# Patient Record
Sex: MALE | Race: BLACK OR AFRICAN AMERICAN | Employment: OTHER | URBAN - METROPOLITAN AREA
[De-identification: names, ages, dates, MRNs, and addresses within clinical notes are randomized per-mention and may not be internally consistent; named-entity substitution may affect disease eponyms.]

---

## 2017-02-10 ENCOUNTER — TRANSFERRED RECORDS (OUTPATIENT)
Dept: HEALTH INFORMATION MANAGEMENT | Facility: CLINIC | Age: 52
End: 2017-02-10

## 2017-03-29 ENCOUNTER — TRANSFERRED RECORDS (OUTPATIENT)
Dept: HEALTH INFORMATION MANAGEMENT | Facility: CLINIC | Age: 52
End: 2017-03-29

## 2017-04-13 ENCOUNTER — TRANSFERRED RECORDS (OUTPATIENT)
Dept: HEALTH INFORMATION MANAGEMENT | Facility: CLINIC | Age: 52
End: 2017-04-13

## 2017-05-09 ENCOUNTER — TRANSFERRED RECORDS (OUTPATIENT)
Dept: HEALTH INFORMATION MANAGEMENT | Facility: CLINIC | Age: 52
End: 2017-05-09

## 2017-05-19 ENCOUNTER — TRANSFERRED RECORDS (OUTPATIENT)
Dept: HEALTH INFORMATION MANAGEMENT | Facility: CLINIC | Age: 52
End: 2017-05-19

## 2017-05-24 ENCOUNTER — TRANSFERRED RECORDS (OUTPATIENT)
Dept: HEALTH INFORMATION MANAGEMENT | Facility: CLINIC | Age: 52
End: 2017-05-24

## 2017-06-21 ENCOUNTER — TRANSFERRED RECORDS (OUTPATIENT)
Dept: HEALTH INFORMATION MANAGEMENT | Facility: CLINIC | Age: 52
End: 2017-06-21

## 2017-07-11 ENCOUNTER — PRE VISIT (OUTPATIENT)
Dept: ORTHOPEDICS | Facility: CLINIC | Age: 52
End: 2017-07-11

## 2017-07-11 NOTE — TELEPHONE ENCOUNTER
1.  Date/reason for appt: 7/27/17 L hip avascular necrosis   2.  Referring provider: MICHELLE DAVIS   3.  Call to patient (Yes / No - short description): no referral   4.  Previous care at / records requested from: [facility]  5.  Other: Records received from [facility].   Included  Office notes: 6/21/17, 5/12/17   [facility] CC: 5/9/17(ER)  Radiology reports: xray chest on 5/19/17   MRI lumbar on 5/9/17- [facility]    MRI left hip on 4/13/17   US left lower ext on 3/29/17    Missing/needed records: injection- [facility]

## 2017-07-13 NOTE — TELEPHONE ENCOUNTER
Faxed request to [facility] for images, [facility] for records/images and [facility] for images.

## 2017-07-14 NOTE — TELEPHONE ENCOUNTER
Records received from [facility]. Waiting for image.   Included  Office notes: 5/24/17, 5/12/17, 2/10/17  Radiology reports: MRI lumbar on 2/10/17

## 2017-07-20 NOTE — TELEPHONE ENCOUNTER
Radiology reports received from [facility]. Images are in PACS.  Left lower ext 6/26/17, 3/29/17  Injection 6/8/17, 2/21/17  MRI left hip 4/13/17

## 2017-07-27 DIAGNOSIS — G89.29 CHRONIC LEFT HIP PAIN: Primary | ICD-10-CM

## 2017-07-27 DIAGNOSIS — M25.552 CHRONIC LEFT HIP PAIN: Primary | ICD-10-CM

## 2017-08-21 NOTE — TELEPHONE ENCOUNTER
Faxed request to [facility] for missing images.    Detail Level: Zone Plan: Retained suture fragment from inferior tip of scar. F/U in 3 months if pt does not see improvement and we discuss other possible options.

## 2017-09-07 ENCOUNTER — OFFICE VISIT (OUTPATIENT)
Dept: ORTHOPEDICS | Facility: CLINIC | Age: 52
End: 2017-09-07

## 2017-09-07 VITALS — BODY MASS INDEX: 27.58 KG/M2 | HEIGHT: 67 IN | WEIGHT: 175.7 LBS

## 2017-09-07 DIAGNOSIS — M25.552 HIP PAIN, LEFT: Primary | ICD-10-CM

## 2017-09-07 RX ORDER — ALBUTEROL SULFATE 90 UG/1
2 AEROSOL, METERED RESPIRATORY (INHALATION)
COMMUNITY
Start: 2017-05-24

## 2017-09-07 RX ORDER — FLUOXETINE 40 MG/1
40 CAPSULE ORAL
COMMUNITY

## 2017-09-07 RX ORDER — PHENYTOIN SODIUM 100 MG/1
100 CAPSULE, EXTENDED RELEASE ORAL
COMMUNITY
Start: 2017-03-10

## 2017-09-07 RX ORDER — ATORVASTATIN CALCIUM 40 MG/1
40 TABLET, FILM COATED ORAL
COMMUNITY
Start: 2017-08-21 | End: 2018-08-21

## 2017-09-07 RX ORDER — LORATADINE 10 MG/1
TABLET ORAL
COMMUNITY
Start: 2017-03-20

## 2017-09-07 RX ORDER — GLIPIZIDE 5 MG/1
5 TABLET, FILM COATED, EXTENDED RELEASE ORAL
COMMUNITY
Start: 2017-04-07 | End: 2018-04-07

## 2017-09-07 RX ORDER — TOLTERODINE 4 MG/1
4 CAPSULE, EXTENDED RELEASE ORAL
COMMUNITY

## 2017-09-07 RX ORDER — LISINOPRIL 2.5 MG/1
2.5 TABLET ORAL
COMMUNITY
Start: 2017-08-21 | End: 2018-08-21

## 2017-09-07 RX ORDER — CLOPIDOGREL BISULFATE 75 MG/1
75 TABLET ORAL
COMMUNITY
Start: 2017-08-21 | End: 2018-08-21

## 2017-09-07 RX ORDER — CYCLOBENZAPRINE HCL 10 MG
TABLET ORAL
COMMUNITY
Start: 2017-05-01

## 2017-09-07 RX ORDER — OXYCODONE AND ACETAMINOPHEN 5; 325 MG/1; MG/1
TABLET ORAL
COMMUNITY
Start: 2017-09-05

## 2017-09-07 RX ORDER — FLUTICASONE PROPIONATE 50 MCG
SPRAY, SUSPENSION (ML) NASAL
COMMUNITY
Start: 2017-07-03

## 2017-09-07 RX ORDER — METOPROLOL SUCCINATE 50 MG/1
50 TABLET, EXTENDED RELEASE ORAL
COMMUNITY
Start: 2017-09-07 | End: 2018-09-07

## 2017-09-07 ASSESSMENT — ACTIVITIES OF DAILY LIVING (ADL)
ADL_SUBSCALE_SCORE: 67.64
ADL_SUM: 22
ADL_MEAN: 1.29

## 2017-09-07 NOTE — PROGRESS NOTES
Chief Complaint: Consult (Pt. states that he is here today for Left Hip Pain. He mention that he been having pain for about 2 years. His last cortisone injection was 6 months ago, not effective. Referring:  NURY DAVIS)      Physician:  Nury Davis    HPI: Berto Pandey is a 51 year old male who presents today for evaluation of his left hip    Symptom Profile  Location of symptoms: 1) lateral hip 2) center of the low back, buttock, posterior thigh, calf.  Duration of symptoms: over two years    Previous Treatments: Previous treatments include activity modification, oral pain medication    Has had two lumbar spine injections this year and the first was reported as 50% initial improvement of symptoms and the second as 100% intial relief of symptoms.     Current Status:  Results of the patient s Hip Disability and Osteoarthritis Outcome Score (HOOS)  are as follows (0-100 scales with 100 being the theoretical best):  Pain: 57.5  Symptoms:75  ADLs:67.64  Sports/Recreation:50  Quality of Life:25  (http://koos.nu/)  [facility] Activity Score:    MEDICAL HISTORY: No past medical history on file.  Seizures- last seizure within the past year   Prostate CA- has been treated  Hrt trouble- has angina including at rest and reports what sounds like a history of angioplasty  DM  History of low back pain with radicular symptoms    Pertinent negatives:  Patient has no history of DVT or PE. Discussed risk factors.    Medications:     Current Outpatient Prescriptions:      albuterol (PROAIR HFA/PROVENTIL HFA/VENTOLIN HFA) 108 (90 BASE) MCG/ACT Inhaler, Inhale 2 puffs into the lungs, Disp: , Rfl:      aspirin 81 MG tablet, Take 81 mg by mouth, Disp: , Rfl:      atorvastatin (LIPITOR) 40 MG tablet, Take 40 mg by mouth, Disp: , Rfl:      clopidogrel (PLAVIX) 75 MG tablet, Take 75 mg by mouth, Disp: , Rfl:      cyclobenzaprine (FLEXERIL) 10 MG tablet, TAKE 1 TABLET BY MOUTH THREE TIMES A DAY AS NEEDED FOR MUSCLE SPASMS FOR UP TO 10  DAYS., Disp: , Rfl:      FLUoxetine (PROZAC) 40 MG capsule, Take 40 mg by mouth, Disp: , Rfl:      fluticasone (FLONASE) 50 MCG/ACT spray, PLACE 2 SPRAYS INTO BOTH NOSTRILS DAILY., Disp: , Rfl:      glipiZIDE (GLUCOTROL XL) 5 MG 24 hr tablet, Take 5 mg by mouth, Disp: , Rfl:      lisinopril (PRINIVIL/ZESTRIL) 2.5 MG tablet, Take 2.5 mg by mouth, Disp: , Rfl:      loratadine (CLARITIN) 10 MG tablet, TAKE 1 TAB BY MOUTH DAILY., Disp: , Rfl:      phenytoin (DILANTIN) 100 MG CR capsule, Take 100 mg by mouth, Disp: , Rfl:      tolterodine (DETROL LA) 4 MG 24 hr capsule, Take 4 mg by mouth, Disp: , Rfl:      oxyCODONE-acetaminophen (PERCOCET) 5-325 MG per tablet, , Disp: , Rfl:      Multiple Vitamins-Iron (MULTI-VITAMIN  /IRON) TABS, , Disp: , Rfl:      metoprolol (TOPROL-XL) 50 MG 24 hr tablet, Take 50 mg by mouth, Disp: , Rfl:      phenytoin (DILANTIN) 100 MG ER capsule, Take 300 mg by mouth daily, Disp: , Rfl:      PHENobarbital (LUMINAL) 30 MG tablet, Take 30 mg by mouth daily, Disp: , Rfl:     Allergies: Review of patient's allergies indicates no known allergies.    SURGICAL HISTORY: No past surgical history on file.    FAMILY HISTORY: No family history on file.    SOCIAL HISTORY:   Social History   Substance Use Topics     Smoking status: Current Every Day Smoker     Packs/day: 0.50     Smokeless tobacco: Not on file     Alcohol use No       REVIEW OF SYSTEMS:  The comprehensive review of systems from the intake form was reviewed with the patient.  No fever, weight change or fatigue. No dry eyes. No oral ulcers, sore throat or voice change. No palpitations, syncope, angina or edema.  No chest pain, excessive sleepiness, shortness of breath or hemoptysis.   No abdominal pain, nausea, vomiting, diarrhea or heartburn.  No skin rash. No focal weakness or numbness. No bleeding or lymphadenopathy. No rhinitis or hives.     Exam:  On physical examination the patient appears the stated age, is in no acute distress,  "affectThe is appropriate, and breathing is non-labored.  Vitals are documented in the EMR and have been reviewed:    Ht 1.702 m (5' 7\")  Wt 79.7 kg (175 lb 11.2 oz)  BMI 27.52 kg/m2  5' 7\"  Body mass index is 27.52 kg/(m^2).    Rises from chair: easily   Gait: normal   Gains the exam table: easily     LEFT hip subjective: not irritated   Abd:30  Add:10  PFC:  Flexion: 100  IRF:10  ERF:30  Impingement test: +/-    Distally, the circulatory, motor, and sensation exam is intact with 5/5 EHL, gastroc-soleus, and tibialis anterior.  Sensation to light touch is intact.  Dorsalis pedis and posterior tibialis pulses are palpable.  There are no sores on the feet, no bruising, and no lymphedema.    X-rays:   Final Report   Streak: Left hip pain.    COMPARISON: Single view of the pelvis 2/10/2017.    FINDINGS: Penile prosthesis hardware or other hardware over the venous  and scrotum. There is mild to moderate axial narrowing of both hip  joints with early osteophyte formation about the inferomedial margins  of both femoral heads. No acute fracture, dislocation or femoral head  osteonecrosis changes.    IMPRESSION: Mild osteoarthrosis about both hips.    ELENO SINGLETARY MD  Principal   Name: ELENO SINGLETARY  Provider ID: [id number]      Assessment: This is a 51 year old with what may be two different issues: a clear low back problem with a documented history and two injections that had significant and then complete temporary relief of symptoms and 2) a some groin pain that would be consistent with his MR with AVN. The examination today is equivocal. Suspicion is that the majority of this is a back issue.     Plan:  Diagnostic injection left hip. Discussed paying attention to the initial response as we will use this for decision making.     "

## 2017-09-07 NOTE — LETTER
9/7/2017       RE: Berto Pandey  [address]  [place] [address]     Dear Colleague,    Thank you for referring your patient, Berto Pandey, to the [facility] at [facility]. Please see a copy of my visit note below.    Chief Complaint: Consult (Pt. states that he is here today for Left Hip Pain. He mention that he been having pain for about 2 years. His last cortisone injection was 6 months ago, not effective. Referring:  NURY DAVIS)      Physician:  Nury Davis    HPI: Berto Pandey is a 51 year old male who presents today for evaluation of his left hip    Symptom Profile  Location of symptoms: 1) lateral hip 2) center of the low back, buttock, posterior thigh, calf.  Duration of symptoms: over two years    Previous Treatments: Previous treatments include activity modification, oral pain medication    Has had two lumbar spine injections this year and the first was reported as 50% initial improvement of symptoms and the second as 100% intial relief of symptoms.     Current Status:  Results of the patient s Hip Disability and Osteoarthritis Outcome Score (HOOS)  are as follows (0-100 scales with 100 being the theoretical best):  Pain: 57.5  Symptoms:75  ADLs:67.64  Sports/Recreation:50  Quality of Life:25  (http://koos.nu/)  [facility] Activity Score:    MEDICAL HISTORY: No past medical history on file.  Seizures- last seizure within the past year   Prostate CA- has been treated  Hrt trouble- has angina including at rest and reports what sounds like a history of angioplasty  DM  History of low back pain with radicular symptoms    Pertinent negatives:  Patient has no history of DVT or PE. Discussed risk factors.    Medications:     Current Outpatient Prescriptions:      albuterol (PROAIR HFA/PROVENTIL HFA/VENTOLIN HFA) 108 (90 BASE) MCG/ACT Inhaler, Inhale 2 puffs into the lungs, Disp: , Rfl:      aspirin 81 MG tablet, Take 81 mg by mouth, Disp: , Rfl:       atorvastatin (LIPITOR) 40 MG tablet, Take 40 mg by mouth, Disp: , Rfl:      clopidogrel (PLAVIX) 75 MG tablet, Take 75 mg by mouth, Disp: , Rfl:      cyclobenzaprine (FLEXERIL) 10 MG tablet, TAKE 1 TABLET BY MOUTH THREE TIMES A DAY AS NEEDED FOR MUSCLE SPASMS FOR UP TO 10 DAYS., Disp: , Rfl:      FLUoxetine (PROZAC) 40 MG capsule, Take 40 mg by mouth, Disp: , Rfl:      fluticasone (FLONASE) 50 MCG/ACT spray, PLACE 2 SPRAYS INTO BOTH NOSTRILS DAILY., Disp: , Rfl:      glipiZIDE (GLUCOTROL XL) 5 MG 24 hr tablet, Take 5 mg by mouth, Disp: , Rfl:      lisinopril (PRINIVIL/ZESTRIL) 2.5 MG tablet, Take 2.5 mg by mouth, Disp: , Rfl:      loratadine (CLARITIN) 10 MG tablet, TAKE 1 TAB BY MOUTH DAILY., Disp: , Rfl:      phenytoin (DILANTIN) 100 MG CR capsule, Take 100 mg by mouth, Disp: , Rfl:      tolterodine (DETROL LA) 4 MG 24 hr capsule, Take 4 mg by mouth, Disp: , Rfl:      oxyCODONE-acetaminophen (PERCOCET) 5-325 MG per tablet, , Disp: , Rfl:      Multiple Vitamins-Iron (MULTI-VITAMIN  /IRON) TABS, , Disp: , Rfl:      metoprolol (TOPROL-XL) 50 MG 24 hr tablet, Take 50 mg by mouth, Disp: , Rfl:      phenytoin (DILANTIN) 100 MG ER capsule, Take 300 mg by mouth daily, Disp: , Rfl:      PHENobarbital (LUMINAL) 30 MG tablet, Take 30 mg by mouth daily, Disp: , Rfl:     Allergies: Review of patient's allergies indicates no known allergies.    SURGICAL HISTORY: No past surgical history on file.    FAMILY HISTORY: No family history on file.    SOCIAL HISTORY:   Social History   Substance Use Topics     Smoking status: Current Every Day Smoker     Packs/day: 0.50     Smokeless tobacco: Not on file     Alcohol use No       REVIEW OF SYSTEMS:  The comprehensive review of systems from the intake form was reviewed with the patient.  No fever, weight change or fatigue. No dry eyes. No oral ulcers, sore throat or voice change. No palpitations, syncope, angina or edema.  No chest pain, excessive sleepiness, shortness of breath or  "hemoptysis.   No abdominal pain, nausea, vomiting, diarrhea or heartburn.  No skin rash. No focal weakness or numbness. No bleeding or lymphadenopathy. No rhinitis or hives.     Exam:  On physical examination the patient appears the stated age, is in no acute distress, affectThe is appropriate, and breathing is non-labored.  Vitals are documented in the EMR and have been reviewed:    Ht 1.702 m (5' 7\")  Wt 79.7 kg (175 lb 11.2 oz)  BMI 27.52 kg/m2  5' 7\"  Body mass index is 27.52 kg/(m^2).    Rises from chair: easily   Gait: normal   Gains the exam table: easily     LEFT hip subjective: not irritated   Abd:30  Add:10  PFC:  Flexion: 100  IRF:10  ERF:30  Impingement test: +/-    Distally, the circulatory, motor, and sensation exam is intact with 5/5 EHL, gastroc-soleus, and tibialis anterior.  Sensation to light touch is intact.  Dorsalis pedis and posterior tibialis pulses are palpable.  There are no sores on the feet, no bruising, and no lymphedema.    X-rays:   Final Report   Streak: Left hip pain.    COMPARISON: Single view of the pelvis 2/10/2017.    FINDINGS: Penile prosthesis hardware or other hardware over the venous  and scrotum. There is mild to moderate axial narrowing of both hip  joints with early osteophyte formation about the inferomedial margins  of both femoral heads. No acute fracture, dislocation or femoral head  osteonecrosis changes.    IMPRESSION: Mild osteoarthrosis about both hips.    ELENO SINGLETARY MD  Principal   Name: ELENO SINGLETARY  Provider ID: [id number]      Assessment: This is a 51 year old with what may be two different issues: a clear low back problem with a documented history and two injections that had significant and then complete temporary relief of symptoms and 2) a some groin pain that would be consistent with his MR with AVN. The examination today is equivocal. Suspicion is that the majority of this is a back issue.     Plan:  Diagnostic injection left hip. Discussed " paying attention to the initial response as we will use this for decision making.       Again, thank you for allowing me to participate in the care of your patient.      Sincerely,    Reyes Becerra MD

## 2017-09-07 NOTE — NURSING NOTE
"Reason For Visit:   Chief Complaint   Patient presents with     Consult     Pt. states that he is here today for Left Hip Pain. He mention that he been having pain for about 2 years. His last cortisone injection was 6 months ago, not effective. Referring:  MICHELLE DAVIS                       HEIGHT: 5' 7\", WEIGHT: 175 lbs 11.2 oz, BMI: Body mass index is 27.52 kg/(m^2).      Current Outpatient Prescriptions   Medication Sig Dispense Refill     albuterol (PROAIR HFA/PROVENTIL HFA/VENTOLIN HFA) 108 (90 BASE) MCG/ACT Inhaler Inhale 2 puffs into the lungs       aspirin 81 MG tablet Take 81 mg by mouth       atorvastatin (LIPITOR) 40 MG tablet Take 40 mg by mouth       clopidogrel (PLAVIX) 75 MG tablet Take 75 mg by mouth       cyclobenzaprine (FLEXERIL) 10 MG tablet TAKE 1 TABLET BY MOUTH THREE TIMES A DAY AS NEEDED FOR MUSCLE SPASMS FOR UP TO 10 DAYS.       FLUoxetine (PROZAC) 40 MG capsule Take 40 mg by mouth       fluticasone (FLONASE) 50 MCG/ACT spray PLACE 2 SPRAYS INTO BOTH NOSTRILS DAILY.       glipiZIDE (GLUCOTROL XL) 5 MG 24 hr tablet Take 5 mg by mouth       lisinopril (PRINIVIL/ZESTRIL) 2.5 MG tablet Take 2.5 mg by mouth       loratadine (CLARITIN) 10 MG tablet TAKE 1 TAB BY MOUTH DAILY.       phenytoin (DILANTIN) 100 MG CR capsule Take 100 mg by mouth       tolterodine (DETROL LA) 4 MG 24 hr capsule Take 4 mg by mouth       oxyCODONE-acetaminophen (PERCOCET) 5-325 MG per tablet        Multiple Vitamins-Iron (MULTI-VITAMIN  /IRON) TABS        metoprolol (TOPROL-XL) 50 MG 24 hr tablet Take 50 mg by mouth       phenytoin (DILANTIN) 100 MG ER capsule Take 300 mg by mouth daily       PHENobarbital (LUMINAL) 30 MG tablet Take 30 mg by mouth daily          No Known Allergies            "

## 2017-09-07 NOTE — MR AVS SNAPSHOT
After Visit Summary   9/7/2017    Berto Pandey    MRN: [id number]           Patient Information     Date Of Birth          1965        Visit Information        Provider Department      9/7/2017 1:30 PM Reyes Becerra MD [facility]        Today's Diagnoses     Hip pain, left    -  1       Follow-ups after your visit        Your next 10 appointments already scheduled     Sep 11, 2017  1:30 PM CDT   XR INJECTION with UCXR3,  IMAGING NURSE,  [facility]   [facility] ([facility])    [address] [address] [phone]           Stop drinking 1 hour before the exam.  You may take your medicines as usual, except for blood thinners (Coumadin, Plavix, Ticlid, Persantine, Aggrenox, Pletal, Effient, Brilliant). Talk to your doctor if you take these.  Tell your doctor if:   You have ever had an allergic reaction to X-ray dye (contrast fluid).   There is a chance you may be pregnant.  Please bring a list of your current medicines to your exam. Include vitamins, minerals and over-the-counter medicines.  Please call the Imaging Department at your exam site with any questions.              Future tests that were ordered for you today     Open Future Orders        Priority Expected Expires Ordered    XR Joint Injection Major Left Routine 9/7/2017 9/7/2018 9/7/2017            Who to contact     Please call your clinic at [phone] to:    Ask questions about your health    Make or cancel appointments    Discuss your medicines    Learn about your test results    Speak to your doctor   If you have compliments or concerns about an experience at your clinic, or if you wish to file a complaint, please contact [facility] Patient Relations at [phone] or email us at [email or web address]         Additional Information About Your Visit        MyChart Information     MyChart is  "an electronic gateway that provides easy, online access to your medical records. With Gatheredtable, you can request a clinic appointment, read your test results, renew a prescription or communicate with your care team.     To sign up for Gatheredtable visit the website at [email or web address]   You will be asked to enter the access code listed below, as well as some personal information. Please follow the directions to create your username and password.     Your access code is: [email or web address]  Expires: 10/11/2017  6:31 AM     Your access code will  in 90 days. If you need help or a new code, please contact your [facility] or call [phone] for assistance.        Care EveryWhere ID     This is your Care EveryWhere ID. This could be used by other organizations to access your [facility] medical records  [id number]        Your Vitals Were     Height BMI (Body Mass Index)                1.702 m (5' 7\") 27.52 kg/m2           Blood Pressure from Last 3 Encounters:   16 142/85   11/11/15 (!) 132/92    Weight from Last 3 Encounters:   17 79.7 kg (175 lb 11.2 oz)   16 77.8 kg (171 lb 9.6 oz)   11/11/15 77.4 kg (170 lb 9.6 oz)               Primary Care Provider Office Phone # Fax #    Nury Bach [phone]       [facility] [address] [address]        Equal Access to Services     ANAID DENG AH: Hadii aad ku hadasho Soomaali, waaxda luqadaha, qaybta kaalmada adeegyada, josy pavon. So [facility] [phone].    ATENCIÓN: Si habla español, tiene a cast disposición servicios gratuitos de asistencia lingüística. Llame al [phone].    We comply with applicable federal civil rights laws and [place] laws. We do not discriminate on the basis of race, color, national origin, age, disability sex, sexual orientation or gender identity.            Thank you!     Thank you for choosing [facility] " ORTHOP[facility]EDIC CLINIC  for your care. Our goal is always to provide you with excellent care. Hearing back from our patients is one way we can continue to improve our services. Please take a few minutes to complete the written survey that you may receive in the mail after your visit with us. Thank you!             Your Updated Medication List - Protect others around you: Learn how to safely use, store and throw away your medicines at www.disposemymeds.org.          This list is accurate as of: 9/7/17  2:11 PM.  Always use your most recent med list.                   Brand Name Dispense Instructions for use Diagnosis    albuterol 108 (90 BASE) MCG/ACT Inhaler    PROAIR HFA/PROVENTIL HFA/VENTOLIN HFA     Inhale 2 puffs into the lungs        aspirin 81 MG tablet      Take 81 mg by mouth        atorvastatin 40 MG tablet    LIPITOR     Take 40 mg by mouth        clopidogrel 75 MG tablet    PLAVIX     Take 75 mg by mouth        cyclobenzaprine 10 MG tablet    FLEXERIL     TAKE 1 TABLET BY MOUTH THREE TIMES A DAY AS NEEDED FOR MUSCLE SPASMS FOR UP TO 10 DAYS.        * DILANTIN 100 MG CR capsule   Generic drug:  phenytoin      Take 300 mg by mouth daily        * phenytoin 100 MG CR capsule    DILANTIN     Take 100 mg by mouth        FLUoxetine 40 MG capsule    PROzac     Take 40 mg by mouth        fluticasone 50 MCG/ACT spray    FLONASE     PLACE 2 SPRAYS INTO BOTH NOSTRILS DAILY.        glipiZIDE 5 MG 24 hr tablet    GLUCOTROL XL     Take 5 mg by mouth        lisinopril 2.5 MG tablet    PRINIVIL/Zestril     Take 2.5 mg by mouth        loratadine 10 MG tablet    CLARITIN     TAKE 1 TAB BY MOUTH DAILY.        metoprolol 50 MG 24 hr tablet    TOPROL-XL     Take 50 mg by mouth        MULTI-vitamin  /IRON Tabs           oxyCODONE-acetaminophen 5-325 MG per tablet    PERCOCET          PHENobarbital 30 MG tablet    LUMINAL     Take 30 mg by mouth daily        tolterodine 4 MG 24 hr capsule    DETROL LA     Take 4 mg by mouth        *  Notice:  This list has 2 medication(s) that are the same as other medications prescribed for you. Read the directions carefully, and ask your doctor or other care provider to review them with you.

## 2017-09-21 ENCOUNTER — TELEPHONE (OUTPATIENT)
Dept: ORTHOPEDICS | Facility: CLINIC | Age: 52
End: 2017-09-21

## 2017-09-21 NOTE — TELEPHONE ENCOUNTER
"Berto was referred to Dr Becerra, from Dr Greenwood, PCP in [facility]. He was given an injection 9/7 by Dr Becerra, states he received no relief, \"I'm calling cause they told me to if it didn't work\".  Message forwarded to Dr Becerra's cc, Dalila RN.  I informed Berto Conner would be contacting him, he is agreeable to wait.  "

## 2017-09-22 ENCOUNTER — TELEPHONE (OUTPATIENT)
Dept: ORTHOPEDICS | Facility: CLINIC | Age: 52
End: 2017-09-22

## 2017-09-22 NOTE — TELEPHONE ENCOUNTER
9/21 - Patient called to inform Dr. Becerra's team that his injection had not worked and wondering what his next steps are. Patient had his injection on 9/11. Reached out to the patient and instructed that steroid injections can take up to 2 weeks to take their full effect. Advised that the patient wait through the weekend and reach out again on Monday, 9/25 with an update. Patient expressed understanding and will be in contact on Monday.

## 2017-11-01 DIAGNOSIS — M54.42 MIDLINE LOW BACK PAIN WITH LEFT-SIDED SCIATICA: Primary | ICD-10-CM

## 2018-06-12 ENCOUNTER — TRANSFERRED RECORDS (OUTPATIENT)
Dept: HEALTH INFORMATION MANAGEMENT | Facility: CLINIC | Age: 53
End: 2018-06-12

## 2018-08-14 ENCOUNTER — MEDICAL CORRESPONDENCE (OUTPATIENT)
Dept: HEALTH INFORMATION MANAGEMENT | Facility: CLINIC | Age: 53
End: 2018-08-14

## 2018-10-02 NOTE — TELEPHONE ENCOUNTER
FUTURE VISIT INFORMATION      FUTURE VISIT INFORMATION:    Date: 10/15/2018    Time: 3:00    Location: [facility]  REFERRAL INFORMATION:    Referring provider:  JENNIFER GLEZ    Referring providers clinic:  [facility]    Reason for visit/diagnosis  DYSPHAGIA AND THROAT PAIN    RECORDS REQUESTED FROM:       Clinic name Comments Records Status Imaging Status   [facility] OFFICE VISIT: 06/12/2018, 04/27/2018, 12/14/2017  LARYNGOSCOPY 12/14/2017 INTERNAL NONE                                   RECORDS STATUS    SEE CARE EVERYWHERE OFFICE VISIT: 06/26/2018, 10/01/2018

## 2018-10-15 ENCOUNTER — OFFICE VISIT (OUTPATIENT)
Dept: OTOLARYNGOLOGY | Facility: CLINIC | Age: 53
End: 2018-10-15
Payer: COMMERCIAL

## 2018-10-15 ENCOUNTER — PRE VISIT (OUTPATIENT)
Dept: OTOLARYNGOLOGY | Facility: CLINIC | Age: 53
End: 2018-10-15

## 2018-10-15 ENCOUNTER — RADIANT APPOINTMENT (OUTPATIENT)
Dept: CT IMAGING | Facility: CLINIC | Age: 53
End: 2018-10-15
Attending: OTOLARYNGOLOGY
Payer: COMMERCIAL

## 2018-10-15 VITALS — BODY MASS INDEX: 27.47 KG/M2 | HEIGHT: 67 IN | WEIGHT: 175 LBS

## 2018-10-15 DIAGNOSIS — K22.10 EROSIVE ESOPHAGITIS: ICD-10-CM

## 2018-10-15 DIAGNOSIS — J38.7 LARYNGEAL MASS: ICD-10-CM

## 2018-10-15 DIAGNOSIS — R49.0 DYSPHONIA: ICD-10-CM

## 2018-10-15 DIAGNOSIS — R05.9 COUGH: Primary | ICD-10-CM

## 2018-10-15 DIAGNOSIS — J38.7 IRRITABLE LARYNX: Primary | ICD-10-CM

## 2018-10-15 DIAGNOSIS — R07.0 THROAT PAIN: ICD-10-CM

## 2018-10-15 DIAGNOSIS — R05.3 CHRONIC COUGH: ICD-10-CM

## 2018-10-15 PROBLEM — E11.9 DIABETES MELLITUS, TYPE 2 (H): Status: ACTIVE | Noted: 2017-04-07

## 2018-10-15 PROBLEM — M54.50 LOW BACK PAIN, EPISODIC: Status: ACTIVE | Noted: 2017-04-27

## 2018-10-15 PROBLEM — K20.0 EOSINOPHILIC ESOPHAGITIS: Status: ACTIVE | Noted: 2018-06-26

## 2018-10-15 PROBLEM — I20.0 UNSTABLE ANGINA (H): Status: ACTIVE | Noted: 2017-12-19

## 2018-10-15 PROBLEM — N52.9 ERECTILE DYSFUNCTION: Status: ACTIVE | Noted: 2017-04-19

## 2018-10-15 PROBLEM — I73.9 PAD (PERIPHERAL ARTERY DISEASE) (H): Status: ACTIVE | Noted: 2018-01-22

## 2018-10-15 PROBLEM — E11.9 TYPE 2 DIABETES MELLITUS WITHOUT COMPLICATION, WITHOUT LONG-TERM CURRENT USE OF INSULIN (H): Status: ACTIVE | Noted: 2017-11-30

## 2018-10-15 PROBLEM — R07.9 CHEST PAIN: Status: ACTIVE | Noted: 2017-12-19

## 2018-10-15 PROBLEM — R94.31 ABNORMAL EKG: Status: ACTIVE | Noted: 2017-12-19

## 2018-10-15 PROBLEM — R56.9 SEIZURES (H): Status: ACTIVE | Noted: 2018-10-06

## 2018-10-15 PROBLEM — E78.5 DYSLIPIDEMIA: Status: ACTIVE | Noted: 2017-11-02

## 2018-10-15 LAB
CREAT BLD-MCNC: 1.2 MG/DL (ref 0.66–1.25)
GFR SERPL CREATININE-BSD FRML MDRD: 64 ML/MIN/1.7M2

## 2018-10-15 RX ORDER — PANTOPRAZOLE SODIUM 40 MG/1
40 TABLET, DELAYED RELEASE ORAL
COMMUNITY

## 2018-10-15 RX ORDER — IOPAMIDOL 755 MG/ML
100 INJECTION, SOLUTION INTRAVASCULAR ONCE
Status: COMPLETED | OUTPATIENT
Start: 2018-10-15 | End: 2018-10-15

## 2018-10-15 RX ORDER — METHOCARBAMOL 500 MG/1
500 TABLET, FILM COATED ORAL
COMMUNITY

## 2018-10-15 RX ORDER — METFORMIN HCL 500 MG
1000 TABLET, EXTENDED RELEASE 24 HR ORAL
COMMUNITY

## 2018-10-15 RX ORDER — COLCHICINE 0.6 MG/1
TABLET ORAL
COMMUNITY

## 2018-10-15 RX ORDER — LEVETIRACETAM 500 MG/1
TABLET, FILM COATED, EXTENDED RELEASE ORAL
COMMUNITY
Start: 2018-03-27

## 2018-10-15 RX ORDER — FLUTICASONE PROPIONATE 220 UG/1
2 AEROSOL, METERED RESPIRATORY (INHALATION)
COMMUNITY
Start: 2018-06-26 | End: 2019-06-26

## 2018-10-15 RX ORDER — MIRTAZAPINE 15 MG/1
15 TABLET, FILM COATED ORAL
COMMUNITY

## 2018-10-15 RX ORDER — ISOSORBIDE MONONITRATE 30 MG/1
30 TABLET, EXTENDED RELEASE ORAL
COMMUNITY

## 2018-10-15 RX ORDER — ARIPIPRAZOLE 10 MG/1
10 TABLET ORAL DAILY
Refills: 6 | COMMUNITY
Start: 2018-09-27

## 2018-10-15 RX ORDER — LATANOPROST 50 UG/ML
1 SOLUTION/ DROPS OPHTHALMIC
COMMUNITY

## 2018-10-15 RX ORDER — PANTOPRAZOLE SODIUM 40 MG/1
TABLET, DELAYED RELEASE ORAL
Refills: 1 | COMMUNITY
Start: 2018-10-11

## 2018-10-15 RX ORDER — POLYETHYLENE GLYCOL 3350 17 G/17G
17 POWDER, FOR SOLUTION ORAL
COMMUNITY

## 2018-10-15 RX ORDER — NITROGLYCERIN 0.4 MG/1
0.4 TABLET SUBLINGUAL
COMMUNITY

## 2018-10-15 RX ADMIN — IOPAMIDOL 100 ML: 755 INJECTION, SOLUTION INTRAVASCULAR at 17:10

## 2018-10-15 NOTE — LETTER
10/15/2018      RE: Berto Pandey  [address] [place] [address]       Dear Dr. Werner:    I had the pleasure of meeting Berto Pandey in consultation at the [facility] of the [facility] at your request, for evaluation of multiple throat concerns. The note from our visit follows. Speech recognition software may have been used in the documentation below; input is reviewed before signature to the best of my ability. I appreciate the opportunity to participate in the care of this pleasant patient.    Please feel free to contact me with any questions.    Sincerely yours,    Mehnaz Dawson M.D., M.P.H.  , Laryngology  Director, [facility]  Otolaryngology- Head & Neck Surgery  [phone]          =====    History of Present Illness:   Berto Pandey is a pleasant 52-year-old male with a history of tobacco, alcohol use, diabetes, cardiac stent who presents with a many years' long history of throat concerns. Symptoms include throat irritation, mucus in throat, frequent cough, poor voice quality and scratchy voice.      Voice  He reports having voice problems for years, without an obvious inciting event.  The problem began suddenly and is worse with stress, though it is intermittent.  The problem seems stable over time.  Typical vocal demands are extensive.  Speaking vocal effort is moderately increased.  His voice is sometimes normal.  He feels that his throat is bothering him more than his voice.      Swallowing  No concerns.       Cough/Throat-clearing  He has also had cough and throat clearing for years, without an obvious etiology.  This problem began gradually and is worse after heavy voice use.  There is a tickle in his throat prior to symptom onset.  The urge to cough is controllable about 50% of the time.  Triggers for the cough include talking, reflux, postnasal drainage, and trying not to cough. He has  "chronically had the feeling that something is stuck in the back of his throat and is making him gag. Sometimes it is painful. This is intermittent. Sometimes it is triggered by p.o. intake.    He has been followed at [facility] for some time.  He was noted to have chronic arytenoid mucosal irritation and pachydermia, as well as swelling of the left ventricular fold adjacent to the anterior commissure, stable since 2015. A proton pump inhibitor was prescribed and helped considerably, but the problem has persisted to some extent and he is eager for full resolution. No prior speech therapy.      Breathing  No concerns. His wife says he snores a lot.      Throat discomfort  As above.      Reflux-type symptoms  He experiences heartburn/indigestion daily. He is taking reflux medications but has breakthrough reflux.     EGD 4/3/18 showed erosive esophagitis, peptic stenosis at GE junction.  pH probe Nov 2017: report unavailable, but the patient indicates that he was found to have \"acid reflux shooting back up\" his throat.      Prior outside records were reviewed for this visit.      MEDICATIONS:     Current Outpatient Prescriptions   Medication Sig Dispense Refill     albuterol (PROAIR HFA/PROVENTIL HFA/VENTOLIN HFA) 108 (90 BASE) MCG/ACT Inhaler Inhale 2 puffs into the lungs       aspirin 81 MG tablet Take 81 mg by mouth       atorvastatin (LIPITOR) 40 MG tablet Take 40 mg by mouth       clopidogrel (PLAVIX) 75 MG tablet Take 75 mg by mouth       cyclobenzaprine (FLEXERIL) 10 MG tablet TAKE 1 TABLET BY MOUTH THREE TIMES A DAY AS NEEDED FOR MUSCLE SPASMS FOR UP TO 10 DAYS.       FLUoxetine (PROZAC) 40 MG capsule Take 40 mg by mouth       fluticasone (FLONASE) 50 MCG/ACT spray PLACE 2 SPRAYS INTO BOTH NOSTRILS DAILY.       glipiZIDE (GLUCOTROL XL) 5 MG 24 hr tablet Take 5 mg by mouth       lisinopril (PRINIVIL/ZESTRIL) 2.5 MG tablet Take 2.5 mg by mouth       loratadine (CLARITIN) 10 MG tablet TAKE 1 TAB BY MOUTH DAILY.  "      metoprolol (TOPROL-XL) 50 MG 24 hr tablet Take 50 mg by mouth       Multiple Vitamins-Iron (MULTI-VITAMIN  /IRON) TABS        oxyCODONE-acetaminophen (PERCOCET) 5-325 MG per tablet        PHENobarbital (LUMINAL) 30 MG tablet Take 30 mg by mouth daily       phenytoin (DILANTIN) 100 MG CR capsule Take 100 mg by mouth       phenytoin (DILANTIN) 100 MG ER capsule Take 300 mg by mouth daily       tolterodine (DETROL LA) 4 MG 24 hr capsule Take 4 mg by mouth         ALLERGIES:  No Known Allergies    PAST MEDICAL HISTORY:   Past Medical History:   Diagnosis Date     Abnormal EKG 12/19/2017     Advance care planning 7/14/2015    Overview:  [place] Restricted Subscriber Program -Provider Dr. Nury Bach MD is designated as the restricted subscriber for physician services. -[facility] is designated as the restricted subscriber for Physician Services, Inpatient Hospital, Outpatient Hospital. -[id number] is designated as the restricted subscriber for Pharmacy. Erika MACIAS RN  [phone] ext [phone] Nurse Investigator ; RESTRICTION: Insurance Restricted      Alcohol abuse, continuous 11/14/2011     Alcohol dependence in remission (H) 4/7/2015     Anemia, iron deficiency 7/9/2012     Angioedema 5/25/2013    Overview:  Right sided facial and neck swelling.  No evidence of infection.  Was on Lisinopril and NSAIDs at time of development. Did not have any trouble with breathing.     Anxiety state 11/24/2014    Overview:  Epic      Chest pain 12/19/2017     Chest pain, non-cardiac 11/11/2016    Overview:  left upper anterior chest     Chronic kidney disease, stage III (moderate) (H) 7/6/2015     Chronic midline posterior neck pain 8/22/2014     Chronic pain 11/12/2010     Chronic rhinitis 10/31/2006     Cognitive disorder 4/23/2014     Coronary artery spasm (H) 6/5/2015     DDD (degenerative disc disease), lumbar 2/17/2014     Depressive disorder 4/23/2014    Overview:  Depressive disorder, not elsewhere  classified ([facility]) Overview:  Overview:  Depressive disorder, not elsewhere classified ([facility])     Diabetes mellitus, type 2 (H) 4/7/2017     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     Dyslipidemia 11/2/2017     Encounter for pain management planning 5/19/2015    Overview:  Patient is Same Day Appts Only in [facility] a until 05/19/2016 ; Patient is Same Day Appts in [facility]     Eosinophilic esophagitis 6/26/2018     Epileptic seizure (H) 1/27/2006    Overview:  Epic      Erectile dysfunction 4/19/2017     Esophageal reflux 4/13/2006     Generalized convulsive epilepsy with intractable epilepsy (H) 11/11/2015     GERD (gastroesophageal reflux disease) 7/9/2012     Gout 7/22/2016     Helicobacter pylori infection 5/12/2013     History of narcotic use 10/3/2014    Overview:  Date of Care Plan:  Updated 1/3/2018  Date Care Plan expires: 1/3/2019  Background:   Pain Diagnosis: Chronic low back pain and chronic posterior neck pain.  History of L2, L3, L4 vertebrae fracture.  Spondylosis lumbar spine. Prescription Monitoring Program was reviewed: Yes - Date:  1/3/2018  Goals/Recommendations: Should be seen in the clinic every 3 month(s).  Medication used as needed (prn) for breakthrough or intermittent pain:     Percocet 5/325 1 tab q 6 hr as needed   Refills:   If parameters are met, the medication can be refilled.   All [facility] at [facility].   Should be requested during the work week up to noon on Fridays.   If requested outside above parameters, patient should be instructed to make an appointment with me the next available clinic day.  ER & UC: The Emergency Room and Urgent Care are not to be used for routine medical care or treatment of chronic pain.  Opioid medication will not be given in t     HTN (hypertension) 11/14/2011     Hypersomnia with sleep apnea 2/15/2007    Overview:  [facility]      Hypertension 11/12/2010     Iron deficiency anemia 7/9/2012     Low back  "pain, episodic 4/27/2017     Lumbago 1/27/2006     Lumbar spondylosis 10/5/2014     Memory loss 11/11/2015     PAD (peripheral artery disease) (H) 1/22/2018     Prolonged depressive reaction 1/27/2006     Prostate CA (H) 7/18/2014     Prostate cancer (H) 8/18/2014     Psychosexual dysfunction with inhibited sexual excitement 3/13/2006     Recurrent major depressive disorder (H) 7/9/2012     Seizure disorder (H) 7/7/2012     Seizures (H) 10/6/2018     ITZEL (stress urinary incontinence), male 4/26/2016     Tobacco use disorder 2/15/2007     Type 2 diabetes mellitus without complication, without long-term current use of insulin (H) 11/30/2017     Unstable angina (H) 12/19/2017     Urinary incontinence 8/25/2014     Vitamin D deficiency 2/18/2014        PAST SURGICAL HISTORY: No past surgical history on file.     HABITS/SOCIAL HISTORY:    Social History   Substance Use Topics     Smoking status: Current Every Day Smoker     Packs/day: 0.50     Smokeless tobacco: Not on file     Alcohol use No   1/2 ppd, no alcohol.  \"5 years clean & sober.\"       FAMILY HISTORY: Noncontributory.    REVIEW OF SYSTEMS:  The patient completed a comprehensive 11 point review of systems (below), which was reviewed. Positives are as noted below; pertinent findings are as noted in the HPI.     Patient Supplied Answers to Review of Systems  No flowsheet data found.    PHYSICAL EXAMINATION:  General: The patient was alert and conversant, and in no acute distress. Patient accompanied by his spouse.  Eyes: PERRL, conjunctiva and lids normal, sclera nonicteric.  Nose: Anterior rhinoscopy: no gross abnormalities. no  bleeding; no  mucopurulence; septum grossly normal, mild mucoid drainage and/or crusting.  Oral cavity/oropharynx: No masses or lesions. Dentition in fair condition. Floor of mouth and oral tongue soft to palpation. Tongue mobility and palate elevation intact and symmetric.  Ears: Normal auricles, external auditory canals bilaterally. " Visualized portions of tympanic membranes normal bilaterally.   Neck: No palpable cervical lymphadenopathy. There was mild tenderness to palpation of the thyrohyoid space, which was narrow. No obvious thyroid abnormality. Landmarks palpable.  Resp: Breathing comfortably, no stridor or stertor.  Neuro: Symmetric facial function. Other cranial nerves as documented above.  Psych: Normal affect, pleasant and cooperative.  Voice/speech: Mild dysphonia characterized by breathiness and roughness.  Extremities: No cyanosis, clubbing, or edema of the upper extremities.    Intake scores  Total Score for Last Patient-Answered VHI Questionnaire  VHI Total Score 10/15/2018   VHI Total Score 20     Total Score for Last Patient-Answered EAT Questionnaire  No flowsheet data found.    Total Score for Last Patient-Answered CSI Questionnaire  No flowsheet data found.      PROCEDURE:   Flexible fiberoptic laryngoscopy and laryngovideostroboscopy  Indications: This procedure was warranted to evaluate the patient's laryngeal anatomy and function. Risks, benefits, and alternatives were discussed.  Description: After written informed consent was obtained, a time-out was performed to confirm patient identity, procedure, and procedure site. Topical 3% lidocaine with 0.25% phenylephrine was applied to the nasal cavities. I performed the endoscopy and no complications were apparent. Continuous and stroboscopic light were utilized to assess routine phonation and variable frequency phonation.  Performed by: Mehnaz Dawson MD MPH  SLP: Breann Chaney, PhD, CCC-SLP   Findings: Normal nasopharynx. Normal base of tongue, valleculae, and epiglottis. Vocal fold mobility: right: normal; left: normal. Medial edges of the vocal folds: smooth and straight. No focal mucosal lesions were observed on the true vocal folds. Glissade produced appropriate elongation. There was moderate supraglottic recruitment with connected speech. Mucosa of false vocal  folds, aryepiglottic folds, piriform sinuses, and posterior glottis unremarkable. Posterior glottis with pachydermia. Left anterior supraglottic mass, with benign mucosa overlying (presumed to be previously seen in laryngoscopy as far back as 2015 per records). Airway was patent.   No focal lesions on NBI.    The addition of stroboscopy allowed evaluation of the mucosal wave.   Amplitude: right: normal; left: normal. Symmetry: good symmetry. Closure pattern: complete. Closure plane: at glottic level. Phase distribution: normal. Supraglottic mass appears separate from true vocal folds based on good bilateral vibratory function.          RESULTS  EGD 4/3/18  Impression:         - Erosive esophagitis.                      - Peptic stenosis at GE junction, dilated to 18 mm.    IMPRESSION AND PLAN:   Berto Pandey presents with irritable larynx symptoms in the context of ongoing breakthrough reflux. He incidentally has a small anterior laryngeal mass which may represent a small laryngocele or soft tissue mass; this does have a benign appearance overall.    Plan:  1) Speech therapy with goals including reducing laryngeal irritability, decreasing vocal fold trauma and improving respiratory/phonatory coordination.   2) CT scan of the neck to evaluate laryngeal mass, assess for occult findings given long tobacco/alcohol history.  3) Gastroenterology referral given ongoing breakthrough reflux symptoms, and history of erosive esophagitis.  4) Discuss sleep study with primary care physician.     The timing of his return will depend on the outcomes of the steps above. He will certainly return if his throat symptoms persist despite speech therapy. I appreciate the opportunity to participate in the care of this patient.              Mehnaz Dawson MD

## 2018-10-15 NOTE — MR AVS SNAPSHOT
After Visit Summary   10/15/2018    Berto Pandey    MRN: [id number]           Patient Information     Date Of Birth          1965        Visit Information        Provider Department      10/15/2018 3:00 PM Mehnaz Dawson MD [facility]        Today's Diagnoses     Irritable larynx    -  1    Dysphonia        Throat pain        Laryngeal mass        Erosive esophagitis        Chronic cough          Care Instructions    1.  You were seen in the ENT Clinic today by Dr. Dawson.  If you have any questions or concerns after your appointment, please call [phone].  Press option #1 for scheduling related needs.  Press option #3 for Nurse advice.  2.  Please initiate speech therapy at the [facility].  Call Lanny at [phone] if you require assistance in scheduling.    3. CT scan of the neck - We will call you with the results.      Bia SANTIAGO, RN  [facility]   Head & Neck Surgery               Follow-ups after your visit        Additional Services     GASTROENTEROLOGY ADULT REF CONSULT ONLY       Preferred Location: [facility], [facility] [phone]      Please be aware that coverage of these services is subject to the terms and limitations of your health insurance plan.  Call member services at your health plan with any benefit or coverage questions.  Any procedures must be performed at a [place] facility OR coordinated by your clinic's referral office.    Please bring the following with you to your appointment:    (1) Any X-Rays, CTs or MRIs which have been performed.  Contact the facility where they were done to arrange for  prior to your scheduled appointment.    (2) List of current medications   (3) This referral request   (4) Any documents/labs given to you for this referral            GASTROENTEROLOGY ADULT REF CONSULT ONLY       Preferred Location: [facility] [phone]      Please be aware that  coverage of these services is subject to the terms and limitations of your health insurance plan.  Call member services at your health plan with any benefit or coverage questions.  Any procedures must be performed at a [place] facility OR coordinated by your clinic's referral office.    Please bring the following with you to your appointment:    (1) Any X-Rays, CTs or MRIs which have been performed.  Contact the facility where they were done to arrange for  prior to your scheduled appointment.    (2) List of current medications   (3) This referral request   (4) Any documents/labs given to you for this referral            [facility] REFERRAL       SPEECH-LANGUAGE PATHOLOGY SERVICE(S) REQUESTED:  Evaluate and treat  Laryngoscopy; add Stroboscopy as needed for assessment of vibratory characteristics of vocal fold mucosa in evaluation of dysphonia; add Flexible Endoscopic Evaluation of Swallowing as needed for evaluation of swallow when dysphagia is suspected; if patient is unable to tolerate laryngoscopy, may use 3% lidocaine/0.25% phenylephrine or 20% oral anesthetic benzocaine as needed, but not for evaluation of swallow.    Breann Chaney, Ph.D., CCC/SLP  Speech-Language Pathologist  Director, [facility]  [phone]    Nae Kearney M.M., M.A., CCC/SLP  Speech-Language Pathologist  [facility]  [phone]    Allison Alpers, M.A., CCC/SLP  Speech-Language Pathologist  [facility]    Donell Bowers M.M., M.A., CCC/SLP  Speech-Language Pathologist  [facility]                  Your next 10 appointments already scheduled     Nov 19, 2018 10:00 AM CST   (Arrive by 9:45 AM)   New Patient Visit with Dony Bowser MD   [facility] ([facility])    [address] [place] [id number]   [phone]            Dec 05, 2018 11:00 AM CST   (Arrive by 10:45 AM)   RETURN SLP VOICE with Luis  ANH Bowers   [facility] ([facility])    [address] [address]   [phone]            Dec 12, 2018  1:00 PM CST   (Arrive by 12:45 PM)   RETURN SLP VOICE with ANH Paredes   [facility] ([facility])    [address] [address]   [phone]            Dec 27, 2018 11:00 AM CST   (Arrive by 10:45 AM)   RETURN SLP VOICE with ANH Paredes   [facility] ([facility])    [address] [address]   [phone]            Jan 29, 2019  1:00 PM CST   (Arrive by 12:45 PM)   RETURN SLP VOICE with ANH Paredes   [facility] ([facility])    [address] [place] [address]   [phone]            Feb 05, 2019  2:00 PM CST   (Arrive by 1:45 PM)   New Patient Visit with Radha Blevins MD   [facility] ([facility])    [address] [place] [id number] [phone]              Who to contact     Please call your clinic at [phone] to:    Ask questions about your health    Make or cancel appointments    Discuss your medicines    Learn about your test results    Speak to your doctor            Additional Information About Your Visit        MyChart Information     [employer] is an electronic gateway that provides easy, online access to your medical records. With [employer], you can request a clinic appointment, read your test results, renew a prescription or communicate with your care team.     To sign up for [employer] visit the website at [email or web address]   You will be asked to enter the access code listed below, as well as some personal information. Please follow the directions to create your username and password.     Your access code is: [email or web address]  Expires: 12/26/2018   "2:59 PM     Your access code will  in 90 days. If you need help or a new code, please contact your [facility] or call [phone] for assistance.        Care EveryWhere ID     This is your Care EveryWhere ID. This could be used by other organizations to access your [facility] medical records  [id number]        Your Vitals Were     Height BMI (Body Mass Index)                1.702 m (5' 7\") 27.41 kg/m2           Blood Pressure from Last 3 Encounters:   16 142/85   11/11/15 (!) 132/92    Weight from Last 3 Encounters:   10/15/18 79.4 kg (175 lb)   17 79.7 kg (175 lb 11.2 oz)   16 77.8 kg (171 lb 9.6 oz)              We Performed the Following     GASTROENTEROLOGY ADULT REF CONSULT ONLY     GASTROENTEROLOGY ADULT REF CONSULT ONLY     IMAGESTREAM RECORDING ORDER     LARYNGOSCOPY FLEX/RIGID W STROBOSCOPY     [facility] REFERRAL        Primary Care Provider Office Phone # Fax #    Nury Bach [phone]       [facility] [address] [facility] [address]        Equal Access to Services     MASON DENG AH: Hadii aad ku hadasho Soreeseali, waaxda luqadaha, qaybta kaalmada adeegyada, josy harpern jenna pavon. So [facility] [phone].    ATENCIÓN: Si habla español, tiene a cast disposición servicios gratuitos de asistencia lingüística. [facility] [phone].    We comply with applicable federal civil rights laws and [place] laws. We do not discriminate on the basis of race, color, national origin, age, disability, sex, sexual orientation, or gender identity.            Thank you!     Thank you for choosing [facility]  for your care. Our goal is always to provide you with excellent care. Hearing back from our patients is one way we can continue to improve our services. Please take a few minutes to complete the written survey that you may receive in the mail after your visit with us. Thank " you!             Your Updated Medication List - Protect others around you: Learn how to safely use, store and throw away your medicines at www.disposemymeds.org.          This list is accurate as of 10/15/18 11:59 PM.  Always use your most recent med list.                   Brand Name Dispense Instructions for use Diagnosis    albuterol 108 (90 Base) MCG/ACT inhaler    PROAIR HFA/PROVENTIL HFA/VENTOLIN HFA     Inhale 2 puffs into the lungs        ARIPiprazole 10 MG tablet    ABILIFY     Take 10 mg by mouth daily        aspirin 81 MG tablet      Take 81 mg by mouth        atorvastatin 40 MG tablet    LIPITOR     Take 40 mg by mouth        clopidogrel 75 MG tablet    PLAVIX     Take 75 mg by mouth        colchicine 0.6 MG tablet    COLCYRS     Indications: Gout        cyclobenzaprine 10 MG tablet    FLEXERIL     TAKE 1 TABLET BY MOUTH THREE TIMES A DAY AS NEEDED FOR MUSCLE SPASMS FOR UP TO 10 DAYS.        * DILANTIN 100 MG CR capsule   Generic drug:  phenytoin      Take 300 mg by mouth daily        * phenytoin 100 MG CR capsule    DILANTIN     Take 100 mg by mouth        FLUoxetine 40 MG capsule    PROzac     Take 40 mg by mouth        fluticasone 220 MCG/ACT Inhaler    FLOVENT HFA     Take 2 puffs by mouth        fluticasone 50 MCG/ACT spray    FLONASE     PLACE 2 SPRAYS INTO BOTH NOSTRILS DAILY.        glipiZIDE 5 MG 24 hr tablet    GLUCOTROL XL     Take 5 mg by mouth        isosorbide mononitrate 30 MG 24 hr tablet    IMDUR     Take 30 mg by mouth        latanoprost 0.005 % ophthalmic solution    XALATAN     Apply 1 drop to eye        levETIRAcetam 500 MG 24 hr tablet    KEPPRA XR     TAKE 1 TABLET BY MOUTH EVERY DAY        lisinopril 2.5 MG tablet    PRINIVIL/Zestril     Take 2.5 mg by mouth        loratadine 10 MG tablet    CLARITIN     TAKE 1 TAB BY MOUTH DAILY.        metFORMIN 500 MG 24 hr tablet    GLUCOPHAGE-XR     Take 1,000 mg by mouth        methocarbamol 500 MG tablet    ROBAXIN     Take 500 mg by mouth         metoprolol succinate 50 MG 24 hr tablet    TOPROL-XL     Take 50 mg by mouth        mirtazapine 15 MG tablet    REMERON     Take 15 mg by mouth        MULTI-vitamin  /IRON Tabs           nitroGLYcerin 0.4 MG sublingual tablet    NITROSTAT     Place 0.4 mg under the tongue        oxyCODONE-acetaminophen 5-325 MG per tablet    PERCOCET          * pantoprazole 40 MG EC tablet    PROTONIX     Take 40 mg by mouth        * pantoprazole 40 MG EC tablet    PROTONIX     TAKE 1 TABLET BY MOUTH TWICE A DAY        PHENobarbital 30 MG tablet    LUMINAL     Take 30 mg by mouth daily        polyethylene glycol Packet    MIRALAX/GLYCOLAX     Take 17 g by mouth        tolterodine 4 MG 24 hr capsule    DETROL LA     Take 4 mg by mouth        * Notice:  This list has 4 medication(s) that are the same as other medications prescribed for you. Read the directions carefully, and ask your doctor or other care provider to review them with you.

## 2018-10-15 NOTE — PROGRESS NOTES
Dear Dr. Werner:    I had the pleasure of meeting Berto Pandey in consultation at the [facility] of the [facility] at your request, for evaluation of multiple throat concerns. The note from our visit follows. Speech recognition software may have been used in the documentation below; input is reviewed before signature to the best of my ability. I appreciate the opportunity to participate in the care of this pleasant patient.    Please feel free to contact me with any questions.    Sincerely yours,    Mehnaz Dawson M.D., M.P.H.  , Laryngology  Director, [facility]  Otolaryngology- Head & Neck Surgery  [phone]          =====    History of Present Illness:   Berto Pandey is a pleasant 52-year-old male with a history of tobacco, alcohol use, diabetes, cardiac stent who presents with a many years' long history of throat concerns. Symptoms include throat irritation, mucus in throat, frequent cough, poor voice quality and scratchy voice.      Voice  He reports having voice problems for years, without an obvious inciting event.  The problem began suddenly and is worse with stress, though it is intermittent.  The problem seems stable over time.  Typical vocal demands are extensive.  Speaking vocal effort is moderately increased.  His voice is sometimes normal.  He feels that his throat is bothering him more than his voice.      Swallowing  No concerns.       Cough/Throat-clearing  He has also had cough and throat clearing for years, without an obvious etiology.  This problem began gradually and is worse after heavy voice use.  There is a tickle in his throat prior to symptom onset.  The urge to cough is controllable about 50% of the time.  Triggers for the cough include talking, reflux, postnasal drainage, and trying not to cough. He has chronically had the feeling that something is stuck in the back of his throat and  "is making him gag. Sometimes it is painful. This is intermittent. Sometimes it is triggered by p.o. intake.    He has been followed at [facility] for some time.  He was noted to have chronic arytenoid mucosal irritation and pachydermia, as well as swelling of the left ventricular fold adjacent to the anterior commissure, stable since 2015. A proton pump inhibitor was prescribed and helped considerably, but the problem has persisted to some extent and he is eager for full resolution. No prior speech therapy.      Breathing  No concerns. His wife says he snores a lot.      Throat discomfort  As above.      Reflux-type symptoms  He experiences heartburn/indigestion daily. He is taking reflux medications but has breakthrough reflux.     EGD 4/3/18 showed erosive esophagitis, peptic stenosis at GE junction.  pH probe Nov 2017: report unavailable, but the patient indicates that he was found to have \"acid reflux shooting back up\" his throat.      Prior outside records were reviewed for this visit.      MEDICATIONS:     Current Outpatient Prescriptions   Medication Sig Dispense Refill     albuterol (PROAIR HFA/PROVENTIL HFA/VENTOLIN HFA) 108 (90 BASE) MCG/ACT Inhaler Inhale 2 puffs into the lungs       aspirin 81 MG tablet Take 81 mg by mouth       atorvastatin (LIPITOR) 40 MG tablet Take 40 mg by mouth       clopidogrel (PLAVIX) 75 MG tablet Take 75 mg by mouth       cyclobenzaprine (FLEXERIL) 10 MG tablet TAKE 1 TABLET BY MOUTH THREE TIMES A DAY AS NEEDED FOR MUSCLE SPASMS FOR UP TO 10 DAYS.       FLUoxetine (PROZAC) 40 MG capsule Take 40 mg by mouth       fluticasone (FLONASE) 50 MCG/ACT spray PLACE 2 SPRAYS INTO BOTH NOSTRILS DAILY.       glipiZIDE (GLUCOTROL XL) 5 MG 24 hr tablet Take 5 mg by mouth       lisinopril (PRINIVIL/ZESTRIL) 2.5 MG tablet Take 2.5 mg by mouth       loratadine (CLARITIN) 10 MG tablet TAKE 1 TAB BY MOUTH DAILY.       metoprolol (TOPROL-XL) 50 MG 24 hr tablet Take 50 mg by mouth       Multiple " Vitamins-Iron (MULTI-VITAMIN  /IRON) TABS        oxyCODONE-acetaminophen (PERCOCET) 5-325 MG per tablet        PHENobarbital (LUMINAL) 30 MG tablet Take 30 mg by mouth daily       phenytoin (DILANTIN) 100 MG CR capsule Take 100 mg by mouth       phenytoin (DILANTIN) 100 MG ER capsule Take 300 mg by mouth daily       tolterodine (DETROL LA) 4 MG 24 hr capsule Take 4 mg by mouth         ALLERGIES:  No Known Allergies    PAST MEDICAL HISTORY:   Past Medical History:   Diagnosis Date     Abnormal EKG 12/19/2017     Advance care planning 7/14/2015    Overview:  [place] Rest[place] Sub[place] -Provider Dr. Nury Bach MD is designated as the restricted subscriber for physician services. -[facility] is designated as the restricted subscriber for Physician Services, Inpatient Hospital, Outpatient Hospital. -[id number] is designated as the restricted subscriber for Pharmacy. Erika MACIAS RN  [phone] ext [phone] Nurse Investigator ; RESTRICTION: Insurance Restricted      Alcohol abuse, continuous 11/14/2011     Alcohol dependence in remission (H) 4/7/2015     Anemia, iron deficiency 7/9/2012     Angioedema 5/25/2013    Overview:  Right sided facial and neck swelling.  No evidence of infection.  Was on Lisinopril and NSAIDs at time of development. Did not have any trouble with breathing.     Anxiety state 11/24/2014    Overview:  Epic      Chest pain 12/19/2017     Chest pain, non-cardiac 11/11/2016    Overview:  left upper anterior chest     Chronic kidney disease, stage III (moderate) (H) 7/6/2015     Chronic midline posterior neck pain 8/22/2014     Chronic pain 11/12/2010     Chronic rhinitis 10/31/2006     Cognitive disorder 4/23/2014     Coronary artery spasm (H) 6/5/2015     DDD (degenerative disc disease), lumbar 2/17/2014     Depressive disorder 4/23/2014    Overview:  Depressive disorder, not elsewhere classified ([facility]) Overview:  Overview:  Depressive disorder, not elsewhere  classified ([facility])     Diabetes mellitus, type 2 (H) 4/7/2017     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     Dyslipidemia 11/2/2017     Encounter for pain management planning 5/19/2015    Overview:  Patient is Same Day Appts Only in [facility] a until 05/19/2016 ; Patient is Same Day Appts in [facility]     Eosinophilic esophagitis 6/26/2018     Epileptic seizure (H) 1/27/2006    Overview:  Epic      Erectile dysfunction 4/19/2017     Esophageal reflux 4/13/2006     Generalized convulsive epilepsy with intractable epilepsy (H) 11/11/2015     GERD (gastroesophageal reflux disease) 7/9/2012     Gout 7/22/2016     Helicobacter pylori infection 5/12/2013     History of narcotic use 10/3/2014    Overview:  Date of Care Plan:  Updated 1/3/2018  Date Care Plan expires: 1/3/2019  Background:   Pain Diagnosis: Chronic low back pain and chronic posterior neck pain.  History of L2, L3, L4 vertebrae fracture.  Spondylosis lumbar spine. Prescription Monitoring Program was reviewed: Yes - Date:  1/3/2018  Goals/Recommendations: Should be seen in the clinic every 3 month(s).  Medication used as needed (prn) for breakthrough or intermittent pain:     Percocet 5/325 1 tab q 6 hr as needed   Refills:   If parameters are met, the medication can be refilled.   All [facility] at [facility].   Should be requested during the work week up to noon on Fridays.   If requested outside above parameters, patient should be instructed to make an appointment with me the next available clinic day.  ER & UC: The Emergency Room and Urgent Care are not to be used for routine medical care or treatment of chronic pain.  Opioid medication will not be given in t     HTN (hypertension) 11/14/2011     Hypersomnia with sleep apnea 2/15/2007    Overview:  [facility]      Hypertension 11/12/2010     Iron deficiency anemia 7/9/2012     Low back pain, episodic 4/27/2017     Lumbago 1/27/2006     Lumbar spondylosis  "10/5/2014     Memory loss 11/11/2015     PAD (peripheral artery disease) (H) 1/22/2018     Prolonged depressive reaction 1/27/2006     Prostate CA (H) 7/18/2014     Prostate cancer (H) 8/18/2014     Psychosexual dysfunction with inhibited sexual excitement 3/13/2006     Recurrent major depressive disorder (H) 7/9/2012     Seizure disorder (H) 7/7/2012     Seizures (H) 10/6/2018     ITZEL (stress urinary incontinence), male 4/26/2016     Tobacco use disorder 2/15/2007     Type 2 diabetes mellitus without complication, without long-term current use of insulin (H) 11/30/2017     Unstable angina (H) 12/19/2017     Urinary incontinence 8/25/2014     Vitamin D deficiency 2/18/2014        PAST SURGICAL HISTORY: No past surgical history on file.     HABITS/SOCIAL HISTORY:    Social History   Substance Use Topics     Smoking status: Current Every Day Smoker     Packs/day: 0.50     Smokeless tobacco: Not on file     Alcohol use No   1/2 ppd, no alcohol.  \"5 years clean & sober.\"       FAMILY HISTORY: Noncontributory.    REVIEW OF SYSTEMS:  The patient completed a comprehensive 11 point review of systems (below), which was reviewed. Positives are as noted below; pertinent findings are as noted in the HPI.     Patient Supplied Answers to Review of Systems  No flowsheet data found.    PHYSICAL EXAMINATION:  General: The patient was alert and conversant, and in no acute distress. Patient accompanied by his spouse.  Eyes: PERRL, conjunctiva and lids normal, sclera nonicteric.  Nose: Anterior rhinoscopy: no gross abnormalities. no  bleeding; no  mucopurulence; septum grossly normal, mild mucoid drainage and/or crusting.  Oral cavity/oropharynx: No masses or lesions. Dentition in fair condition. Floor of mouth and oral tongue soft to palpation. Tongue mobility and palate elevation intact and symmetric.  Ears: Normal auricles, external auditory canals bilaterally. Visualized portions of tympanic membranes normal bilaterally.   Neck: No " palpable cervical lymphadenopathy. There was mild tenderness to palpation of the thyrohyoid space, which was narrow. No obvious thyroid abnormality. Landmarks palpable.  Resp: Breathing comfortably, no stridor or stertor.  Neuro: Symmetric facial function. Other cranial nerves as documented above.  Psych: Normal affect, pleasant and cooperative.  Voice/speech: Mild dysphonia characterized by breathiness and roughness.  Extremities: No cyanosis, clubbing, or edema of the upper extremities.    Intake scores  Total Score for Last Patient-Answered VHI Questionnaire  VHI Total Score 10/15/2018   VHI Total Score 20     Total Score for Last Patient-Answered EAT Questionnaire  No flowsheet data found.    Total Score for Last Patient-Answered CSI Questionnaire  No flowsheet data found.      PROCEDURE:   Flexible fiberoptic laryngoscopy and laryngovideostroboscopy  Indications: This procedure was warranted to evaluate the patient's laryngeal anatomy and function. Risks, benefits, and alternatives were discussed.  Description: After written informed consent was obtained, a time-out was performed to confirm patient identity, procedure, and procedure site. Topical 3% lidocaine with 0.25% phenylephrine was applied to the nasal cavities. I performed the endoscopy and no complications were apparent. Continuous and stroboscopic light were utilized to assess routine phonation and variable frequency phonation.  Performed by: Mehnaz Dawson MD MPH  SLP: Breann Chaney, PhD, CCC-SLP   Findings: Normal nasopharynx. Normal base of tongue, valleculae, and epiglottis. Vocal fold mobility: right: normal; left: normal. Medial edges of the vocal folds: smooth and straight. No focal mucosal lesions were observed on the true vocal folds. Glissade produced appropriate elongation. There was moderate supraglottic recruitment with connected speech. Mucosa of false vocal folds, aryepiglottic folds, piriform sinuses, and posterior glottis  unremarkable. Posterior glottis with pachydermia. Left anterior supraglottic mass, with benign mucosa overlying (presumed to be previously seen in laryngoscopy as far back as 2015 per records). Airway was patent.   No focal lesions on NBI.    The addition of stroboscopy allowed evaluation of the mucosal wave.   Amplitude: right: normal; left: normal. Symmetry: good symmetry. Closure pattern: complete. Closure plane: at glottic level. Phase distribution: normal. Supraglottic mass appears separate from true vocal folds based on good bilateral vibratory function.          RESULTS  EGD 4/3/18  Impression:         - Erosive esophagitis.                      - Peptic stenosis at GE junction, dilated to 18 mm.    IMPRESSION AND PLAN:   Berto Pandey presents with irritable larynx symptoms in the context of ongoing breakthrough reflux. He incidentally has a small anterior laryngeal mass which may represent a small laryngocele or soft tissue mass; this does have a benign appearance overall.    Plan:  1) Speech therapy with goals including reducing laryngeal irritability, decreasing vocal fold trauma and improving respiratory/phonatory coordination.   2) CT scan of the neck to evaluate laryngeal mass, assess for occult findings given long tobacco/alcohol history.  3) Gastroenterology referral given ongoing breakthrough reflux symptoms, and history of erosive esophagitis.  4) Discuss sleep study with primary care physician.     The timing of his return will depend on the outcomes of the steps above. He will certainly return if his throat symptoms persist despite speech therapy. I appreciate the opportunity to participate in the care of this patient.

## 2018-10-15 NOTE — PATIENT INSTRUCTIONS
1.  You were seen in the ENT Clinic today by Dr. Dawson.  If you have any questions or concerns after your appointment, please call [phone].  Press option #1 for scheduling related needs.  Press option #3 for Nurse advice.  2.  Please initiate speech therapy at the [facility] - [facility].  Call Lanny at [phone] if you require assistance in scheduling.    3. CT scan of the neck - We will call you with the results.      Bia ROMERON, RN  [facility] ENT   Head & Neck Surgery

## 2018-10-15 NOTE — PROGRESS NOTES
[facility] VOICE CLINIC  Cyrus Quintana Jr., M.D., F.A.C.S.  Mehnaz Dawson M.D., M.P.H.  Breann Chaney, Ph.D., CCC/SLP  Nae Kearney M.M. (voice), M.TONIE., CCC/SLP  Luis Bowers M.M. (voice), M.A., [facility]/SLP    [facility] VOICE [facility]  VOICE/SPEECH/BREATHING EVALUATION AND LARYNGEAL EXAMINATION REPORT    Patient: Berto Pandey  Date of Visit: 10/15/2018    CHIEF COMPLAINT:  Cough, voice    HISTORY  Berto Pandey was seen for initial voice evaluation and laryngeal examination in conjunction with a visit to Dr. Dawson.  Please refer to Dr. Dawson s dictation for a more complete history and impressions. Salient details of his history are as follows:    Mr. Pandey is a 52 year old with a history of cough and dysphonia.    Symptoms began years ago, without obvious precipitating factors    Has had considerable work-up for both problems, especially the cough, with inadequate resolution    Seen at [facility]; laryngeal examination showed a left ventricular lesion or swelling' referred here for further evaluation    No speech therapy for voice or cough    Long history reflux; takes PPI but continues to have symptoms    Feels that there is something in his throat on the left; he has to push on the left side of his throat to feel better, but this makes him cough    Voice is scratchy, and he would like it to be more normal, but it is less of a bother than the cough sensation    Interested in understanding the nature of his cough and dysphonia, and whether anything can be done    DIAGNOSIS/REASON FOR REFERRAL  Cough and Dysphonia/ Evaluate, perform laryngeal exam, treat as appropriate    Patient Supplied Answers To [facility] Intake General Questionnaire  [facility] Intake - General Form Review 10/15/2018   Are you having any of these symptoms? Throat irritation, Mucus in throat, Frequent cough, Poor voice quality   Are you having any of these symptoms? Scratchy voice   Do you use caffeine? Yes   If you do use caffeine, how many oz.  do you typically drink in a day? a lots   How many oz. of non-caffeinated fluid do you typically drink in a day? a very few   How often do you experience heartburn, indigestion, chest pain, stomach acid coming up, and/or tasting acid in your mouth or throat? Daily   Have reflux medications been recommended to you? Yes   If yes, are you taking them regularly? Yes   Voice: Yes   Cough and/or throat-clearing: Yes   Throat discomfort and/or the feeling of a lump in your throat: Yes   A different problem, not listed above: Yes   Other physicians/health care providers who should receive a copy of today's note (please provide first and last name(s), city): Dr mile rome\[facility]   If anyone is helping you complete this form (such as an , clinic staff, caregiver, family member, etc.) please identify them here. no       Patient Supplied Answers To [facility] Intake Voice Questionnaire  [facility] Intake - Voice Review 10/15/2018   How long have you had this voice problem? years   In regards to your voice problem, was there anything unusual about the time the problem was first noticed (such as illness, accident, surgery, etc.)?  If yes, please describe.  You have 200 characters to respond.  We will ask for more detail at your visit. years ago   What do you think caused this voice problem? don't know   How quickly did the voice problem develop? Suddenly   For your voice problem, how do the symptoms vary? Worse with stress, On and off   Over time, how has the voice problem changed? Same   How would you rate your typical vocal demand? Extensive: Voice needs go beyond everyday speech; voice demands are high but can be met even if voice is not perfect   Please list activities that involve your voice (such as singing, coaching, etc.): none   Effort for speaking 6   Effort for singing 2   Overall vocal quality 5   Is your voice ever normal, even briefly? Yes   For your voice problem, what testing/studies have you  "done (such as imaging/reflux testing)? none   Prior to this episode, have you ever had a voice problem before?  If yes, at that time did you have therapy or treatment for the voice problem?  Please provide a brief description of that treatment. no   For your voice problem, is there anything else you'd like to tell us? it's my thoat thats borthing me not the voice   There isn't much I can do to help myself feel better about this problem. Strongly Agree   How I deal with the voice problem now is under my control. Agree somewhat   I don't have much control over my emotional reactions to this problem. Strongly Agree   When I am upset about the voice problem, I can find a way to feel better. Disagree somewhat   I have control over my day-to-day reactions to the voice problem. Agree somewhat   There isn't much I can do to keep the voice problem from affecting me. Strongly Agree   I have control over how I think about the voice problem. Agree somewhat   My reaction to the voice problem is not under my control. Agree somewhat   VPCMEAN 1.75       Patient Supplied Answers To VHI Questionnaire  Voice Handicap Index (VHI-10) 10/15/2018   My voice makes it difficult for people to hear me 3   People have difficulty understanding me in a noisy room 3   My voice difficulties restrict my personal and social life.  2   I feel left out of conversations because of my voice 3   My voice problem causes me to lose income 0   I feel as though I have to strain to produce voice 0   The clarity of my voice is unpredictable 2   My voice problem upsets me 3   My voice makes me feel handicapped 2   People ask, \"What's wrong with your voice?\" 2   VHI-10 20       Patient Supplied Answers To [facility] Int[facility] Throat Discomfort Questionnaire  No flowsheet data found.      Patient Supplied Answers To [facility] Int[facility] Cough/Throat-Clearing Questionnaire  [facility] - Cough/Throat-Clearing Review 10/15/2018   How long have you had this cough/throat-clearing " problem? years   What do you think caused this cough/throat-clearing problem? I don't know this what am trying to find out   How quickly did the cough/throat-clearing problem develop? Gradually   How do the cough/throat-clearing symptoms vary? Worse after heavy voice use, On and off   Is there a tickle in your throat prior to coughing or throat clearing? Yes   What % of the time do you feel like you can control the urge to cough? 50%   Do any of the following trigger your cough? Talking, Heartburn, Post-nasal drainage, Trying to suppress a cough   If you have other triggers for your cough or throat-clearing issue, please list them here. seem like am stranging   What prior treatments have been tried for this cough/throat-clearing issue? Other   What health care providers have you seen for this cough/throat-clearing problem? Who and when? ear nose throat clinic \in [facility]   For your cough/throat-clearing problem, what testing/studies have you done (such as imaging, reflux testing, lung function studies, allergy testing)? queit of few   Did you receive any therapy or treatment for this cough/throat-clearing problem?  If so, please briefly describe. yes   For your cough/throat-clearing problem, is there anything else you'd like to tell us? just want to find out is this a acidreflux problem       Patient Supplied Answers To CSI Questionnaire  No flowsheet data found.    CURRENT PATIENT COMPLAINTS    Primary: cough    Secondary: voice    Denies significant dyspnea, dysphagia, or pain    OTHER PERTINENT HISTORY    Still smoking about 1/2 pack per day    Complex medical Hx, including seizures that are well-controlled    Significant reflux Hx    Please also see Dr. Dawson's dictation    OBJECTIVE FINDINGS  VOICE/ SPEECH/ NON-COMMUNICATIVE LARYNGEAL BEHAVIORS EVALUATION  An evaluation of the voice and cough was accomplished today; salient features are as follows:    Palpation of the laryngeal area shows:    Mild tenderness  of the thyrohyoid area     Markedly reduced thyrohyoid space     Cough/ Throat clear:    FrequentI    Dry    Locus of cough/ throat clear: sounds consistent with upper airway    Moderate    Breathing pattern:    Appears within normal limits and adequate     No overt tension is evident.    Articulation differences, consistent with ebonics    He complains of difficulty pronouncing; it is difficult to discern articulation problems from speech style differences    Voice quality is characterized by    Very mild roughness that increases with tasks or voice use    Very mild inconsistent breathiness    Mild inconsistent secretion noise    Mild inconsistent strain    Habitual pitch is WNL, in the range from Bb2 to Db3    Loudness is WNL and appropriate for the setting    In a task comparing voiced and voiceless phonemes, there is no difference    Sustained vowels:     Comfortable pitch /a/: Ab2 - pressed, strained, rough (more effort than in spontaneous speech)    Comfortable pitch /i/: D3 -also pressed and mildly rough, though much higher in pitch    A singing task shows voice quality is slightly improved in singing, especially at the higher pitches, in the range from Bb2 to G3    In a pitch glide task to determine pitch range, he demonstrates very pressed, strain quality that is not actually indicative of his ability; however, not very stimulable for improvement    GLOBAL ASSESSMENT OF DYSPHONIA: 12 /100    CAPE-V Overall Severity:   24/100    AERODYNAMIC AND ACOUSTIC EVALUATION (CPT 49158 - Laryngeal Function Studies)  Laryngeal Function Studies (CPT 66481)     Acoustic Measures     Protocol / Parameter = result     Fundamental frequency Metrics        /a/ mean F0 = 113 Hz (SD = 1.21 Hz)        /i/ mean F0 = 156 Hz (SD = 0.90 Hz)        [place] Mean f0 = 122 Hz (SD 8.26 Hz)       [place]:            Min F0 = 71 Hz           Max F0 = 234 Hz           Range = 164 Hz           Notes = pitch measures are accurate except  "for glide; measured attempt does not indicate the upper limit of pitch     Cepstral Measures        CPPS /a/ = 32.05 dB        CPPS /i/ = 32.28 dB        CPPS \"all voiced\" = 21.31 dB        AVQI (v.3.01) = 1.19     Additional Measures        Harmonic to Noise Ratio /a/ = 23.27 dB        Harmonic to Noise Ratio: [place] = 16.15 dB       Jitter (local) /a/ = 0.296 %        Shimmer (local) /a/ = 1.975 %     Aerodynamic Measures     Protocol / Parameter Result Normative Mean (SD)   Vital Capacity     Expiratory Volume 2.88 Liters 3.65 (1.09) Liters   Comfortable Sustained Phonation     Mean SPL 70.15 dB 74.59 (4.23) dB   Mean Pitch 108.21 Hz 121.14 (26.89) Hz   Peak Expiratory Airflow 0.488 Lit/Sec 0.15 (0.05) Lit/Sec   Mean Expiratory Airflow 0.303 Lit/Sec 0.11 (0.05) Lit/Sec   Voicing Efficiency     Peak Air Pressure 9.41 cm H2O 8.64 (2.23) cm H2O   Mean Peak Air Pressure 6.36 cm H2O 6.91 (1.68) cm H2O   Peak Expiratory Airflow 0.168 Lit/Sec 0.32 (0.15) Lit/Sec   Mean Airflow During Voicing 0.105 Lit/Sec 0.19 (0.1) Lit/Sec   Running Speech: All Voiced Stimulus     Mean SPL 57.72 dB    Mean Pitch 109.66 Hz    Peak Expiratory Airflow 0.449 Lit/Sec    Mean Expiratory Airflow 0.165 Lit/Sec    Mean Airflow During Voicing 0.143 Lit/Sec    Running Speech: [place]     Mean SPL 57.47 dB    SPL Range 41.15 dB    Mean Pitch 115.06 Hz    Pitch Range 203.09 Hz    Peak Expiratory Airflow 0.738 Lit/Sec    Mean Expiratory Airflow 0.115 Lit/Sec    Expiratory Volume 3.93 Liters    Mean Airflow During Voicing 0.113 Lit/Sec    Peak Inspiratory Airflow -1.221 Lit/Sec    Inspiratory Volume -5.15 Liters        Very high Cepstral measures not consistent with perception of roughness/aperiodicity on sustained vowels.  Aerodynamic measures seem to indicate variable performance and lack of respiratory/phonatory coordination, as airflow varies from low to high, depending on task.  In running speech, frequent short shallow " inhalations were observed.    LARYNGEAL EXAMINATION    Endoscopic laryngeal examination was performed by:  Mhenaz Dawson MD,   I provided technical support, and provided the protocol of instructions for the patient.  Type of exam:   Flexible endoscopy with chip-tip technology and stroboscopy    This exam shows:    Laryngeal and Vocal Fold Mucosa    Posterior commissure hypertrophy    moderate presence of dry thickened secretions on the vocal folds and throughout the laryngeal area    Fairly small swelling in the left ventricle, in the anterior portion    Vocal fold mucosa is   o Otherwise within normal limits, with no lesions and straight vibratory margins    Neurological and Functional Integrity of the Larynx    Vocal folds are mobile and meet at midline; movement is brisk and symmetric; exam is neurologically normal     Tension and incoordination are evident during a task of 20 quickly repeated vowels    Elongation of the vocal folds for pitch increase is WNL    On phonation, glottic closure is WNL    Supraglottic Function and Therapy Probes    moderate anterior-posterior constriction of the supraglottic larynx during connected speech    moderate ventricular approximation during phonation;     Stroboscopy provided the following additional information:    The ventricular swelling vibrates along with the vocal folds, and may provide some source of vibration noise    Vocal fold vibration can be essentially normal    Dr. Dawson and I reviewed this laryngeal exam with Mr. Pandey today, and I provided pertinent explanations, as well as written information:    Endoscopic findings are consistent with audio-perceptual assessment and patient Hx/complaints.    his symptoms are accounted for by probable mucosal irritation and mild muscular hpyerfunction     Because there appears to be a functional component to his symptoms, he would most likely benefit from functional speech therapy.    ASSESSMENT /  PLAN  IMPRESSIONS:  R05 (Chronic Cough)  R49.0 (Dysphonia)    Laryngeal evaluation demonstrated mild mucosal findings and moderate muscular hyperfunction  Dysphonia/discomfort is accounted for by the hyperfunction of the intrinsic and extrinsic laryngeal musculature    Cough/throat clear are accounted for by the hypersensitivity of the larynx and pharynx as evidenced by case history, patient complaints and absence of other organic findings; hypersensitivity is compounded by imbalance in the function of the intrinsic and extrinsic laryngeal musculature during connected speech  Because there appears to be a functional component to his symptoms, he would most likely benefit from a course of functional speech therapy  RECOMMENDATIONS:     A course of speech therapy is recommended to optimize vocal technique, improve voice quality, promote reduced discomfort, effort and fatigue and help reduce chronic cough.    He demonstrates a Good prognosis for improvement given adherence to therapeutic recommendations. Therapeutic     Positive indicators: commitment to process    Negative indicators: none    RATIONALE: Current level of functioning is:  CJ - At least 20 percent but less than 40 percent impaired, limited, or restricted   based on Mr. Pandey's extent of dysphonia, extent of impact on function, extent of discomfort, level of effort.    TREATMENT PLAN  Speech therapy    DURATION/FREQUENCY OF TREATMENT  Eight weekly or bi-weekly, one-hour sessions, with two monthly one-hour follow-up sessions, as well as continuing sessions along with Dr. Dawson to follow-up on meidcal evaluations and interventions     GOALS  Patient goal:    To understand the problem and fix it as much as possible    Short-term goal(s): Within the first 2 sessions, Mr. Pandey:  1.  will demonstrate improved awareness of throat clearing / cough: acknowledging >75% of all cough events during session time with no clinician support  2.  will be able to  demonstrate provided cough suppression and substitution strategies from memory independently with 90% accuracy  3.  will be able to independently list key factors in maintenance of good vocal hygiene with 80% accuracy, and report on their use outside the therapy room.  4.  will demonstrate semi-occluded vocal tract (SOVT) exercises with at least 80% accuracy with no clinician support  5.  will demonstrate the ability to alternate between target and habitual voice quality given clinician cue 75% of the time during therapy tasks  6.  will initiate Resonant Voice Therapy (RVT)    Long-term goal(s): In 8 months, Mr. Pandey will:  1.  Report a week of typical vocal activities, in which dysphonia and throat irritation do not exceed a level of 2 out of 10, 80% of the time   2.  Report a week with no more than 2 episodes of coughing per day, that do not last more than 2 seconds  3.  In a 20-minute conversation task, demonstrate strain and roughness that do not exceed a level of 2 out of 10, 80% of the time, by therapist judgment    G-Codes:   - Functional limitation, current status, at evalution CJ - At least 20 percent but less than 40 percent impaired, limited, or restricted   - Functional limitation, projected goal status, at discharge from therapy CI - At least 1 percent but less than 20 percent impaired, limited, or restricted    This treatment plan was developed with the patient who agreed with the recommendations.    PRIMARY ICD-10 code:  R05 (Chronic Cough)  SECONDARY ICD-10 code:  R49.0 (Dysphonia)     TOTAL SERVICE TIME: 90 minutes  EVALUATION OF VOICE AND RESONANCE: (22235): 60 minutes    LARYNGEAL FUNCTION STUDIES (49525): 30 minutes  NO CHARGE FACILITY FEE (66157)      Breann Chaney, Ph.D., [facility]-SLP  Speech-Language Pathologist  Director, [facility]  [phone]

## 2018-10-15 NOTE — MR AVS SNAPSHOT
After Visit Summary   10/15/2018    Berto Pandey    MRN: [id number]           Patient Information     Date Of Birth          1965        Visit Information        Provider Department      10/15/2018 3:00 PM Breann Chaney, SLP [facility]         Follow-ups after your visit        Your next 10 appointments already scheduled     Oct 24, 2018  3:00 PM CDT   (Arrive by 2:45 PM)   RETURN SLP VOICE with ANH Paredes   [facility] ([facility])    [address] [id number] [phone]            Dec 05, 2018 11:00 AM CST   (Arrive by 10:45 AM)   RETURN SLP VOICE with ANH Paredes   [facility] ([facility])    [address] [place] [id number] [phone]            Dec 12, 2018  1:00 PM CST   (Arrive by 12:45 PM)   RETURN SLP VOICE with ANH Paredes   [facility] ([facility])    [address] [place] [id number] [phone]            Dec 27, 2018 11:00 AM CST   (Arrive by 10:45 AM)   RETURN SLP VOICE with ANH Paredes   [facility] ([facility])    [address] [place] [id number] [phone]            Jan 28, 2019 11:20 AM CST   (Arrive by 11:05 AM)   New Patient Visit with Shiv Keys MD   [facility] ([facility])    [address] [place] [id number] [phone]            Jan 29, 2019  1:00 PM CST   (Arrive by 12:45 PM)   RETURN SLP VOICE with ANH Paredes   [facility] ([facility])    [address] [place] [phone]              Who to contact     Please call your clinic at [phone] to:    Ask questions about your health    Make or cancel appointments    Discuss your  medicines    Learn about your test results    Speak to your doctor            Additional Information About Your Visit        MyChart Information     [employer] is an electronic gateway that provides easy, online access to your medical records. With [employer], you can request a clinic appointment, read your test results, renew a prescription or communicate with your care team.     To sign up for [employer] visit the website at [email or web address]   You will be asked to enter the access code listed below, as well as some personal information. Please follow the directions to create your username and password.     Your access code is: [email or web address]  Expires: 2018  3:59 PM     Your access code will  in 90 days. If you need help or a new code, please contact your [facility] or call [phone] for assistance.        Care EveryWhere ID     This is your Care EveryWhere ID. This could be used by other organizations to access your [place] medical records  [id number]         Blood Pressure from Last 3 Encounters:   16 142/85   11/11/15 (!) 132/92    Weight from Last 3 Encounters:   10/15/18 79.4 kg (175 lb)   17 79.7 kg (175 lb 11.2 oz)   16 77.8 kg (171 lb 9.6 oz)              Today, you had the following     No orders found for display       Primary Care Provider Office Phone # Fax #    Nury Bach [phone]       [facility] [address] [facility] [address]        Equal Access to Services     ANAID DENG AH: Hadii aad ku hadasho Soomaali, waaxda luqadaha, qaybta kaalmada adeegyada, waxay indu pillai . So [facility] [phone].    ATENCIÓN: Si habla español, tiene a cast disposición servicios gratuitos de asistencia lingüística. Llame al [phone].    We comply with applicable federal civil rights laws and [place] laws. We do not discriminate on the basis of race, color, national origin, age,  disability, sex, sexual orientation, or gender identity.            Thank you!     Thank you for choosing  [facility]  for your care. Our goal is always to provide you with excellent care. Hearing back from our patients is one way we can continue to improve our services. Please take a few minutes to complete the written survey that you may receive in the mail after your visit with us. Thank you!             Your Updated Medication List - Protect others around you: Learn how to safely use, store and throw away your medicines at www.disposemymeds.org.          This list is accurate as of 10/15/18  4:55 PM.  Always use your most recent med list.                   Brand Name Dispense Instructions for use Diagnosis    albuterol 108 (90 Base) MCG/ACT inhaler    PROAIR HFA/PROVENTIL HFA/VENTOLIN HFA     Inhale 2 puffs into the lungs        ARIPiprazole 10 MG tablet    ABILIFY     Take 10 mg by mouth daily        aspirin 81 MG tablet      Take 81 mg by mouth        atorvastatin 40 MG tablet    LIPITOR     Take 40 mg by mouth        clopidogrel 75 MG tablet    PLAVIX     Take 75 mg by mouth        colchicine 0.6 MG tablet    COLCYRS     Indications: Gout        cyclobenzaprine 10 MG tablet    FLEXERIL     TAKE 1 TABLET BY MOUTH THREE TIMES A DAY AS NEEDED FOR MUSCLE SPASMS FOR UP TO 10 DAYS.        * DILANTIN 100 MG CR capsule   Generic drug:  phenytoin      Take 300 mg by mouth daily        * phenytoin 100 MG CR capsule    DILANTIN     Take 100 mg by mouth        FLUoxetine 40 MG capsule    PROzac     Take 40 mg by mouth        fluticasone 220 MCG/ACT Inhaler    FLOVENT HFA     Take 2 puffs by mouth        fluticasone 50 MCG/ACT spray    FLONASE     PLACE 2 SPRAYS INTO BOTH NOSTRILS DAILY.        glipiZIDE 5 MG 24 hr tablet    GLUCOTROL XL     Take 5 mg by mouth        isosorbide mononitrate 30 MG 24 hr tablet    IMDUR     Take 30 mg by mouth        latanoprost 0.005 % ophthalmic solution    XALATAN     Apply 1 drop to  eye        levETIRAcetam 500 MG 24 hr tablet    KEPPRA XR     TAKE 1 TABLET BY MOUTH EVERY DAY        lisinopril 2.5 MG tablet    PRINIVIL/Zestril     Take 2.5 mg by mouth        loratadine 10 MG tablet    CLARITIN     TAKE 1 TAB BY MOUTH DAILY.        metFORMIN 500 MG 24 hr tablet    GLUCOPHAGE-XR     Take 1,000 mg by mouth        methocarbamol 500 MG tablet    ROBAXIN     Take 500 mg by mouth        metoprolol succinate 50 MG 24 hr tablet    TOPROL-XL     Take 50 mg by mouth        mirtazapine 15 MG tablet    REMERON     Take 15 mg by mouth        MULTI-vitamin  /IRON Tabs           nitroGLYcerin 0.4 MG sublingual tablet    NITROSTAT     Place 0.4 mg under the tongue        oxyCODONE-acetaminophen 5-325 MG per tablet    PERCOCET          * pantoprazole 40 MG EC tablet    PROTONIX     Take 40 mg by mouth        * pantoprazole 40 MG EC tablet    PROTONIX     TAKE 1 TABLET BY MOUTH TWICE A DAY        PHENobarbital 30 MG tablet    LUMINAL     Take 30 mg by mouth daily        polyethylene glycol Packet    MIRALAX/GLYCOLAX     Take 17 g by mouth        tolterodine 4 MG 24 hr capsule    DETROL LA     Take 4 mg by mouth        * Notice:  This list has 4 medication(s) that are the same as other medications prescribed for you. Read the directions carefully, and ask your doctor or other care provider to review them with you.

## 2018-10-15 NOTE — NURSING NOTE
"Chief Complaint   Patient presents with     Consult     throat pain     Height 1.702 m (5' 7\"), weight 79.4 kg (175 lb).  Wei Hicks    "

## 2018-10-15 NOTE — DISCHARGE INSTRUCTIONS

## 2018-10-21 PROBLEM — R05.9 COUGH: Status: ACTIVE | Noted: 2018-10-21

## 2018-10-21 PROBLEM — R49.0 DYSPHONIA: Status: ACTIVE | Noted: 2018-10-21

## 2018-10-24 ENCOUNTER — OFFICE VISIT (OUTPATIENT)
Dept: OTOLARYNGOLOGY | Facility: CLINIC | Age: 53
End: 2018-10-24
Payer: COMMERCIAL

## 2018-10-24 DIAGNOSIS — R49.0 DYSPHONIA: ICD-10-CM

## 2018-10-24 DIAGNOSIS — R05.3 CHRONIC COUGH: Primary | ICD-10-CM

## 2018-10-24 NOTE — LETTER
"10/24/2018      RE: Berto Pandey  [address] [place] [address]       [facility]  THERAPY NOTE (CPT 38645)    Patient: Berto Pandey  Date of Service: 10/24/2018  Referring physician: Dr. Dawson  Impressions from most recent evaluation:  \"R05 (Chronic Cough)  R49.0 (Dysphonia)    Laryngeal evaluation demonstrated mild mucosal findings and moderate muscular hyperfunction    Dysphonia/discomfort is accounted for by the hyperfunction of the intrinsic and extrinsic laryngeal musculature      Cough/throat clear are accounted for by the hypersensitivity of the larynx and pharynx as evidenced by case history, patient complaints and absence of other organic findings; hypersensitivity is compounded by imbalance in the function of the intrinsic and extrinsic laryngeal musculature during connected speech\"    SUBJECTIVE:  Since the patient's last session, they report the following:     Overall symptoms are about the same which is expected given no therapeutic suggestions were made at his last visit.    Going to have a PH probe on November 15th    Clarification of goals:    Clearer voice quality    Speaking more clearly and being understood    Reducing cough    OBJECTIVE:  PATIENT REPORTED MEASURES:  Patient Supplied Answers To SLP QOL Questionnaire  Therapy Quality of Life 10/24/2018   Since my l ast session, I used the speech therapy exercises and strategies as recommended by my speech pathologist. Not applicable   I feel that using my therapy techniques has become a habit Not applicable   I feel confident in my ability to manage my current and future symptoms. Agree   Since my last session I feel my symptoms have --------. Stayed the same   Overall, since starting therapy I feel my symptoms are --------. About the same     Patient Specific Goal Metrics:  Dysponia SLP Goals 10/24/2018   How would you rate your speaking voice quality, if 0 is worst voice quality, and 10 is best voice? 5   How much does your " voice problem bother you? Very Much   How much does your swallowing problem bother you?      Quite a bit     THERAPEUTIC ACTIVITIES    Counseling and Education:    Asked many questions about the nature of his symptoms, and I answered all of these thoroughly.    Instructed concepts and techniques for optimal vocal hygiene including:    Systemic hydration, including strategies for increasing daily water intake    Topical hydration - Gargling, saline nasal irrigation, humidification, steam, guaifenesin    Environmental barriers to healthy voicing - noise, inhaled irritants, room acoustics    Smoking cessation  o Was given chantix by his primary doctor, but he hasn't followed through at this point.  He was encouraged to follow-up, and stated that he would like to do so.    Awareness and reduction of phonotraumatic behaviors    Moderating voice use    Substituting non-voice alternative behaviors    Avoiding cough and throat clearing    Chronic cough / throat clearing reduction therapy    Suppression and substitution strategies were instructed including    Swallowing substitution techniques    Breathing suppression techniques to reduce laryngeal tension    Low impact glottic coup and soft cough    Techniques to raise awareness of habitual throat clearing    Additionally he was instructed to keep a log of what circumstances are eliciting cough / throat clear    Semi-Occluded Vocal Tract (SOVT) exercises instructed to reduce laryngeal tension, promote vocal fold pliability, and coordinate respiration and phonation    Straw with water resistance was found to be most facilitating     Sustained phonation, and voice vs. voiceless productions used to promote easy voicing and raise awareness of laryngeal tension    Ascending and descending glides utilized to promote vocal fold pliability    Instructed to use these exercises as a warm-up / cooldown, and to re-calibrate the voice throughout the day.    Markedly reduced roughness  and strain is noted with the use of semi-occluded sounds and slight elevation in F0      A regimen for home practice was instructed.    I provided handouts of today's therapeutic activities to facilitate practice.    ASSESSMENT/PLAN  PROGRESS TOWARD LONG TERM GOALS:   Adequate progress; please see above    IMPRESSIONS: R49.0 (Dysphonia) and R05 (Chronic Cough) in the context of an imbalance in function of the intrinsic and extrinsic muscles of the larynx and nonoptimal for coordination of respiration and phonation. Mr. Pandey was able to achieve reduced roughness and improved clarity of voicing with improved phonatory respiratory coordination and slight elevation in F0.  The need for improved vocal hygiene for reduction of throat irritation and also clarity of voicing was stressed.  Patient also describes a pattern of disrupted clarity and speech, though within conversation this manifested just as often as word finding issues as articulation issues.  This may be an area targeted concurrently with improved vocal clarity; however, I explained that dysarthria/articulation focused work was not a primary area of focus for me, and should voice and cough resolve without improvement in intelligibility a referral would be warranted to a motor speech focused speech pathologist.    PLAN: I will see Mr. Pandey in 2-4 weeks, at which point we will continue to advance phonatory respiratory coordination and introduce conversational training therapy.       TOTAL SERVICE TIME: 60 minutes  TREATMENT (35665): 60 minutes  NO CHARGE FACILITY FEE (47726)    Donell Bowers M.M., M.A., CCC-SLP  Speech-Language Pathologist  Certificate of Vocology  [phone]

## 2018-10-24 NOTE — MR AVS SNAPSHOT
After Visit Summary   10/24/2018    Berto Pandey    MRN: [id number]           Patient Information     Date Of Birth          1965        Visit Information        Provider Department      10/24/2018 3:00 PM Luis Bowers SLP [facility]        Today's Diagnoses     Chronic cough    -  1    Dysphonia           Follow-ups after your visit        Your next 10 appointments already scheduled     Nov 19, 2018 10:00 AM CST   (Arrive by 9:45 AM)   New Patient Visit with Dony Bowser MD   [facility] ([facility])    [address] [place] [phone]   [phone]            Dec 05, 2018 11:00 AM CST   (Arrive by 10:45 AM)   RETURN SLP VOICE with ANH Paredes [facility] ([facility])    [address] [place] [address]   [phone]            Dec 12, 2018  1:00 PM CST   (Arrive by 12:45 PM)   RETURN SLP VOICE with ANH Paredes [facility] ([facility])    [address] [place] [phone]   [phone]            Dec 27, 2018 11:00 AM CST   (Arrive by 10:45 AM)   RETURN SLP VOICE with ANH Paredes [facility] ([facility])    [address] [place] [id number] [phone]            Jan 28, 2019 11:20 AM CST   (Arrive by 11:05 AM)   New Patient Visit with Shiv Keys MD   [facility] ([facility])    [address] [place] [address]   [phone]            Jan 29, 2019  1:00 PM CST   (Arrive by 12:45 PM)   RETURN SLP VOICE with ANH Paredes [facility] ([facility])    [address] [place] [address]   [phone]              Who to contact     Please call your clinic at  [phone] to:    Ask questions about your health    Make or cancel appointments    Discuss your medicines    Learn about your test results    Speak to your doctor            Additional Information About Your Visit        MyChart Information     [employer] is an electronic gateway that provides easy, online access to your medical records. With [employer], you can request a clinic appointment, read your test results, renew a prescription or communicate with your care team.     To sign up for [employer] visit the website at [email or web address]   You will be asked to enter the access code listed below, as well as some personal information. Please follow the directions to create your username and password.     Your access code is: [email or web address]  Expires: 2018  3:59 PM     Your access code will  in 90 days. If you need help or a new code, please contact your [facility] or call [phone] for assistance.        Care EveryWhere ID     This is your Care EveryWhere ID. This could be used by other organizations to access your [place] medical records  [id number]         Blood Pressure from Last 3 Encounters:   16 142/85   11/11/15 (!) 132/92    Weight from Last 3 Encounters:   10/15/18 79.4 kg (175 lb)   17 79.7 kg (175 lb 11.2 oz)   16 77.8 kg (171 lb 9.6 oz)              We Performed the Following     SPEECH/HEARING THERAPY, INDIVIDUAL        Primary Care Provider Office Phone # Fax #    Nury Bach [phone]       [facility] [address] [facility] [address]        Equal Access to Services     [facility]: Hadii aad ku hadasho Soreeseali, waaxda luqadaha, qaybta kaalmada josy lawson. So [facility] [phone].    ATENCIÓN: Si habla español, tiene a cast disposición servicios gratuitos de asistencia lingüística. Llame al [phone].    We comply with applicable federal civil  rights laws and [place] laws. We do not discriminate on the basis of race, color, national origin, age, disability, sex, sexual orientation, or gender identity.            Thank you!     Thank you for choosing [facility]  for your care. Our goal is always to provide you with excellent care. Hearing back from our patients is one way we can continue to improve our services. Please take a few minutes to complete the written survey that you may receive in the mail after your visit with us. Thank you!             Your Updated Medication List - Protect others around you: Learn how to safely use, store and throw away your medicines at www.disposemymeds.org.          This list is accurate as of 10/24/18  4:06 PM.  Always use your most recent med list.                   Brand Name Dispense Instructions for use Diagnosis    albuterol 108 (90 Base) MCG/ACT inhaler    PROAIR HFA/PROVENTIL HFA/VENTOLIN HFA     Inhale 2 puffs into the lungs        ARIPiprazole 10 MG tablet    ABILIFY     Take 10 mg by mouth daily        aspirin 81 MG tablet      Take 81 mg by mouth        atorvastatin 40 MG tablet    LIPITOR     Take 40 mg by mouth        clopidogrel 75 MG tablet    PLAVIX     Take 75 mg by mouth        colchicine 0.6 MG tablet    COLCYRS     Indications: Gout        cyclobenzaprine 10 MG tablet    FLEXERIL     TAKE 1 TABLET BY MOUTH THREE TIMES A DAY AS NEEDED FOR MUSCLE SPASMS FOR UP TO 10 DAYS.        * DILANTIN 100 MG CR capsule   Generic drug:  phenytoin      Take 300 mg by mouth daily        * phenytoin 100 MG CR capsule    DILANTIN     Take 100 mg by mouth        FLUoxetine 40 MG capsule    PROzac     Take 40 mg by mouth        fluticasone 220 MCG/ACT Inhaler    FLOVENT HFA     Take 2 puffs by mouth        fluticasone 50 MCG/ACT spray    FLONASE     PLACE 2 SPRAYS INTO BOTH NOSTRILS DAILY.        glipiZIDE 5 MG 24 hr tablet    GLUCOTROL XL     Take 5 mg by mouth        isosorbide mononitrate 30 MG 24 hr tablet     IMDUR     Take 30 mg by mouth        latanoprost 0.005 % ophthalmic solution    XALATAN     Apply 1 drop to eye        levETIRAcetam 500 MG 24 hr tablet    KEPPRA XR     TAKE 1 TABLET BY MOUTH EVERY DAY        lisinopril 2.5 MG tablet    PRINIVIL/Zestril     Take 2.5 mg by mouth        loratadine 10 MG tablet    CLARITIN     TAKE 1 TAB BY MOUTH DAILY.        metFORMIN 500 MG 24 hr tablet    GLUCOPHAGE-XR     Take 1,000 mg by mouth        methocarbamol 500 MG tablet    ROBAXIN     Take 500 mg by mouth        metoprolol succinate 50 MG 24 hr tablet    TOPROL-XL     Take 50 mg by mouth        mirtazapine 15 MG tablet    REMERON     Take 15 mg by mouth        MULTI-vitamin  /IRON Tabs           nitroGLYcerin 0.4 MG sublingual tablet    NITROSTAT     Place 0.4 mg under the tongue        oxyCODONE-acetaminophen 5-325 MG per tablet    PERCOCET          * pantoprazole 40 MG EC tablet    PROTONIX     Take 40 mg by mouth        * pantoprazole 40 MG EC tablet    PROTONIX     TAKE 1 TABLET BY MOUTH TWICE A DAY        PHENobarbital 30 MG tablet    LUMINAL     Take 30 mg by mouth daily        polyethylene glycol Packet    MIRALAX/GLYCOLAX     Take 17 g by mouth        tolterodine 4 MG 24 hr capsule    DETROL LA     Take 4 mg by mouth        * Notice:  This list has 4 medication(s) that are the same as other medications prescribed for you. Read the directions carefully, and ask your doctor or other care provider to review them with you.

## 2018-11-12 ENCOUNTER — TELEPHONE (OUTPATIENT)
Dept: GASTROENTEROLOGY | Facility: CLINIC | Age: 53
End: 2018-11-12

## 2018-11-16 NOTE — TELEPHONE ENCOUNTER
FUTURE VISIT INFORMATION      FUTURE VISIT INFORMATION:    Date: 11/19/18    Time:     Location: [facility]  REFERRAL INFORMATION:    Referring provider:  Dr. Dawson    Referring providers clinic:  [facility]    Reason for visit/diagnosis: Esophagitis

## 2018-11-19 ENCOUNTER — PRE VISIT (OUTPATIENT)
Dept: GASTROENTEROLOGY | Facility: CLINIC | Age: 53
End: 2018-11-19

## 2018-11-19 ENCOUNTER — OFFICE VISIT (OUTPATIENT)
Dept: GASTROENTEROLOGY | Facility: CLINIC | Age: 53
End: 2018-11-19
Payer: COMMERCIAL

## 2018-11-19 ENCOUNTER — PATIENT OUTREACH (OUTPATIENT)
Dept: CARE COORDINATION | Facility: CLINIC | Age: 53
End: 2018-11-19

## 2018-11-19 VITALS
DIASTOLIC BLOOD PRESSURE: 91 MMHG | HEIGHT: 67 IN | WEIGHT: 170.5 LBS | BODY MASS INDEX: 26.76 KG/M2 | SYSTOLIC BLOOD PRESSURE: 125 MMHG | TEMPERATURE: 97.5 F | OXYGEN SATURATION: 96 % | HEART RATE: 91 BPM

## 2018-11-19 DIAGNOSIS — K21.00 GASTROESOPHAGEAL REFLUX DISEASE WITH ESOPHAGITIS: ICD-10-CM

## 2018-11-19 DIAGNOSIS — K59.00 CONSTIPATION, UNSPECIFIED CONSTIPATION TYPE: ICD-10-CM

## 2018-11-19 DIAGNOSIS — R09.A2 GLOBUS PHARYNGEUS: Primary | ICD-10-CM

## 2018-11-19 DIAGNOSIS — K31.84 GASTROPARESIS: ICD-10-CM

## 2018-11-19 ASSESSMENT — PAIN SCALES - GENERAL: PAINLEVEL: SEVERE PAIN (7)

## 2018-11-19 NOTE — PROGRESS NOTES
Note dictated. Job code [id number].    Dony Bowser MD    [facility]  Division of Gastroenterology, Hepatology and Nutrition

## 2018-11-19 NOTE — LETTER
11/19/2018       RE: Berto Pandey  [address] [place] [address]     Dear Colleague,    Thank you for referring your patient, Berto Pandey, to the [facility] GASTRO[facility]OLOGY AND IBD CLINIC at [facility]. Please see a copy of my visit note below.    Note dictated. Job code [id number].    Dony Bowser MD    [facility]  Division of Gastroenterology, Hepatology and Nutrition      Service Date: 11/19/2018      REFERRING PROVIDER:  Mehnaz Dawson MD      PRIMARY CARE PHYSICIAN:  Nruy Bach MD      REASON FOR REFERRAL:  Second opinion for globus sensation.      CHIEF COMPLAINT:  Globus sensation.      HISTORY OF PRESENT ILLNESS:  Berto Pandey is a pleasant 53-year-old gentleman with a past medical history of GERD with possible eosinophilic esophagitis, peripheral artery disease, type 2 diabetes, dyslipidemia, chronic kidney disease and depression who is here today to discuss globus sensation.  The patient has been seen by GI in [place].  He says he has had years of a sensation of globus, and he points right to the level of his thyroid cartilage.  When asked directly, he denies food (solid or liquids) ever getting stuck when he swallows.  This sensation of globus happens whether he is eating or not.  Most often it happens when he is not eating.  He will feel that there is something all of the sudden in his throat, and he feels like he is choking and then the sensation passes.  He also has a separate his history of heartburn, but he does not pair the heartburn and the sensation of choking together.      The patient underwent an EGD by Dr. Singh 04/2018.  The biopsies showed some erosions in the distal esophagus, as well as a mild peptic stricture that was dilated to 18 mm.  I do not have access to the biopsy results. However, the patient was seen by Dr. Watters of the GI in followup, and he noted that in the distal esophagus there  were 6 eosinophils and in the upper esophagus there were over 20 eosinophils.  The question was made whether he had eosinophilic esophagitis.  The patient notes that he was put on a PPI b.i.d., which perhaps did improve his symptoms somewhat; however, he was still having the symptoms, though less often.  The patient then was put on fluticasone to treat possible eosinophilic esophagitis; however, the patient did not take this regularly.      He has followed with ENT for his symptoms of globus, and they found no clear etiology.  The patient is scheduled for a pH probe next week, and so he is, therefore, off of his PPI until then.      The patient also reports that he has been having less frequent and more hard stools.  He takes MiraLax for this, and when he takes this regularly, he does get good results.      REVIEW OF SYSTEMS:  A complete review of systems performed.  Pertinent positives and negatives are as stated above HPI.  The remainder of a complete review of systems is unremarkable.      PAST MEDICAL HISTORY:  This is obtained from the patient's Care Everywhere.     1.  The patient has a history of seizures.     2.  GERD with possible eosinophilic esophagitis, though the definition does not seem all that certain.   3.  Peripheral artery disease.   4.  Stable angina.   5.  Type 2 diabetes.   6.  Dyslipidemia.   7.  Lower back pain.   8.  Erectile dysfunction.   9.  Noncardiac chest pain.   10.  Stress urinary incontinence.   11.  Chronic kidney disease, stage 3.   12.  Coronary artery spasm.   13.  Urinary incontinence.   14.  Prostate cancer.   15.  Depression.   16.  Drug induced mental disorder.   17.  Angioedema in the past thought to be due to lisinopril and/or NSAIDs.   18.  History of H. pylori infection.   19.  History of anemia.   20.  Alcohol abuse in the past, though the patient denies ongoing alcohol use.    21.  Hypertension.      FAMILY HISTORY:  Mother with possible history of throat cancer.  No  history of colon cancer, colon polyps, esophageal cancer or stomach cancer.  No history of pancreas or liver disease.  The patient is unaware of any additional family history.      SOCIAL HISTORY:  He continues to smoke half pack per day.  He denies any alcohol use.  He denies any marijuana use or other illicit drugs.  She is  and is accompanied by his wife today.      MEDICATIONS:  His medication list is extensive and reviewed in Epic.     1.  Albuterol.   2.  Abilify.   3.  Aspirin 81 mg.   4.  Colchicine.   5.  Cyclobenzaprine.   6.  Fluoxetine.   7.  Fluticasone.   8.  Isosorbide mononitrate.   9.  Latanoprost.   10.  Keppra.   11.  Loratadine.   12.  Metformin.   13.  Methocarbamol.   14.  Multivitamin.   15.  Nitroglycerin sublingual.   16.  Oxycodone/acetaminophen.   17.  Pantoprazole 40 mg p.o. b.i.d.  He states he is holding this for his test next week, the pH test.     18.  Phenobarbital.   19.  MiraLax.   20.  Detrol.   21.  Lipitor.   22.  Glipizide.   23.  Lisinopril.   24.  Metoprolol.   25.  Mirtazapine.   26.  Phenytoin.      PHYSICAL EXAMINATION:   VITAL SIGNS:  Blood pressure is 125/91, pulse 91, temperature is 97.5, weight is 170 pounds, BMI is 26, satting 96% on room air.   GENERAL:  He is pleasant, in no acute distress. His affect is somewhat flat.   HEENT:  Head is atraumatic, normocephalic.  Sclerae are anicteric without injection.  Oropharynx is clear with moist mucous membranes.   NECK:  Supple.  Back is nontender to palpation.   HEART:  Normal rate.   LUNGS:  He is breathing comfortably.   ABDOMEN:  Soft, nontender, nondistended.  No rebound or guarding.   EXTREMITIES:  Has no clubbing, cyanosis or edema.   NEUROLOGIC:  Awake, alert and oriented x3 with no focal deficits.   SKIN:  No evidence of rashes.      LABORATORY:  Reviewed in Care Everywhere.  CBC is notable for hemoglobin of 12-13.4, MCV is 85, platelets are 16, creatinine is normal for 1.2, albumin is 3.8.  AST, ALT,  alkaline phosphatase and total bilirubin are all normal.  I cannot see his pathology results from his upper endoscopy.  There are multiple endoscopies reviewed in Care Everywhere.  Endoscopy in 2016 was unremarkable.  Later in 2016, there were some questions of eosinophilic esophagitis.  Biopsies are not available at this time.  He had an endoscopy earlier this year that showed peptic stricture, and the lower part of esophagus that was dilated to 18.  He has had gastric emptying study that showed delayed gastric emptying.  He has had a motility study that has been unremarkable.      ASSESSMENT AND PLAN:  Berto Pandey is a pleasant 53-year-old gentleman with a complicated past medical history as outlined above who is here today to discuss years of globus sensation:      1.  Globus.  The patient has been evaluated by GI at [facility].  He has had multiple endoscopies that have ranged anywhere from normal to a question of eosinophilic esophagitis on the most recent endoscopy earlier this year.  I do not have the biopsy results; however, in the followup GI note, they questioned increased eosinophils.  Given his history of heartburn symptoms and also documented delayed gastric emptying I think it is less likely that he has eosinophilic esophagitis and more likely that he has changes related to chronic reflux.  It is not clear to me that he has been compliant with his medications.  PPI therapy would be likely the best therapy for him going forward.  However, I think to document the presence of reflux, it is very reasonable to proceed with a pH study. This has already been scheduled through his GI providers in [facility].  I agree with getting this while he is off of his acid suppressing medications to ensure we can document that he has reflux.  The patient should follow up with his GI providers in [place] after the study to discuss the next steps.  If it confirms the presence of reflux, then he will likely require high  dose b.i.d. PPI therapy going forward.  He should be reminded to take the PPI 30-60 minutes before his breakfast and dinner.  If he has ongoing symptoms of reflux despite then PPI therapy, then I would recommend addition of ranitidine at bedtime.  This reflux study will confirm the presence of reflux, but it will not tell us for sure whether his symptoms of globus are related to his reflux.  If his symptoms of globus sensation do not improve with maximal therapy of his GERD, then he should continue to follow up with ENT to make sure that there are no other abnormalities that could be contributing to this.  I do note that the patient denies dysphasia and really it seems to be that he has a sensation of globus that is causing him issues.  I do also note that he had a motility study done a couple years ago that showed normal esophageal motility, so this does not need to be further evaluated.  We discussed antireflux precautions with him, and he should follow these lifestyle recommendations going forward.  Please also see the section below regarding gastroparesis, as this can contribute to reflux as well.     2.  GERD symptoms.  Please see above.     3.  History of gastroparesis.  Given his complicated past medical history including CKD and type 2 diabetes, it is not unexpected that he has delayed gastric emptying.  Delayed gastric emptying can exacerbate reflux, and this should be aggressively managed with dietary modifications.  I recommend that the patient follow up with his [place] GI providers to discuss further treatment, and this should include referral to a nutritionist to discuss a gastroparesis diet.  A typical gastroparesis diet includes small, frequent, low-fiber and low-fat meals.     4.  Constipation.  I recommend that the patient take his MiraLax as needed.  He can add soluble fiber such as Citrucel or Metamucil to help keep him more regular.  Soluble fiber typically does not exacerbate gastroparesis.       The patient will follow up with his GI providers and his ENT providers and his primary care provider up in [place].      Thank you very much for the opportunity to take part in the care of this patient.  We would be happy to see her back if there are any further questions.         VIRGINIE ASHTON MD

## 2018-11-19 NOTE — PATIENT INSTRUCTIONS
Good to see you today    I agree with getting the pH probe done next week OFF acid suppression    Then I recommend follow up with GI in [place] to discuss if anything further needs to be done.     Follow anti-reflux precautions    Follow up as needed      For questions regarding your care Monday through Friday, contact the RN GI care coordinator,  Call   [phone] . Your call will be  returned same day, or if consultation is needed with the provider, it may be following business day - or you may send a My Chart message.    For medication refills (prescribed by the GI clinic), contact your pharmacy.    For appointment rescheduling/cancellation, contact [phone]     After hours, or if you have an immediate GI concern and cannot wait for a return call, contact the GI Fellow at [phone] and select option #4.      GERD (Adult)    The esophagus is a tube that carries food from the mouth to the stomach. A valve (the LES, lower esophageal sphincter) at the lower end of the esophagus prevents stomach acid from flowing upward. When this valve doesn't work properly, stomach contents may repeatedly flow back up (reflux) into the esophagus. This is called gastroesophageal reflux disease (GERD). GERD can irritate the esophagus. It can cause problems with pain, swallowing or breathing. In severe cases, GERD can cause recurrent pneumonia (from aspiration or breathing in particles) or other serious problems.  Symptoms of reflux include burning, pressure or sharp pain in the upper abdomen or mid to lower chest. The pain can spread to the neck, back, or shoulder. There may be belching, an acid taste in the back of the throat, chronic cough, or sore throat, or hoarseness. GERD symptoms often occur during the day after a big meal. They can also occur at night when lying down.   Home care  Lifestyle changes can help reduce symptoms. If needed, your healthcare provider may prescribe medicines. Symptoms often improve with  "treatment, but if treatment is stopped, the symptoms often return after a few months. So most persons with GERD will need to continue treatment or get treatment on and off.  Lifestyle changes    Limit or avoid fatty, fried, and spicy foods, as well as coffee, chocolate, mint, and foods with high acid content such as tomatoes and citrus fruit and juices (orange, grapefruit, lemon).    Don t eat large meals, especially at night. Frequent, smaller meals are best. Don't lie down right after eating. And don t eat anything 3 hours before going to bed.    Don't drink alcohol or smoke. As much as possible, stay away from second hand smoke.    If you are overweight, losing weight will reduce symptoms.     Don't wear tight clothing around your stomach area.    If your symptoms occur during sleep, use a foam wedge to elevate your upper body (not just your head.) Or, place 4\" blocks under the head of your bed. Or use 2 bed risers under your bedframe.  Medicines  If needed, medicines can help relieve the symptoms of GERD and prevent damage to the esophagus. Discuss a medicine plan with your healthcare provider. This may include one or more of the following medicines:    Antacids to help neutralize the normal acids in your stomach.    Acid blockers (Histamine or H2 blockers) to decrease acid production.    Acid inhibitors (proton pump inhibitors PPIs) to decrease acid production in a different way than the blockers. They may work better, but can take a little longer to take effect.  Take an antacid 30 to 60 minutes after eating and at bedtime, but not at the same time as an acid blocker.  Try not to take medicines such as ibuprofen and aspirin. If you are taking aspirin for your heart or other medical reasons, talk to your healthcare provider about stopping it.  Follow-up care  Follow up with your healthcare provider or as advised by our staff.  When to seek medical advice  Call your healthcare provider if any of the following " occur:    Stomach pain gets worse or moves to the lower right abdomen (appendix area)    Chest pain appears or gets worse, or spreads to the back, neck, shoulder, or arm    An over-the-counter trial of medicine doesn't relieve your symptoms    Weight loss that can't be explained    Trouble or pain swallowing    Frequent vomiting (can t keep down liquids)    Blood in the stool or vomit (red or black in color)    Feeling weak or dizzy    Fever of 100.4 F (38 C) or higher, or as directed by your healthcare provider  Date Last Reviewed: 3/1/2018    9328-1851 [employer]. [address] [place] [id number]. All rights reserved. This information is not intended as a substitute for professional medical care. Always follow your healthcare professional's instructions.

## 2018-11-19 NOTE — NURSING NOTE
"Chief Complaint   Patient presents with     Consult     NEW - Esophagitis        Vitals:    11/19/18 1010   BP: (!) 125/91   Pulse: 91   Temp: 97.5  F (36.4  C)   TempSrc: Oral   SpO2: 96%   Weight: 170 lb 8 oz   Height: 5' 7\"       Body mass index is 26.7 kg/(m^2).      Bjorn Robledo on 11/19/2018 at 10:16 AM                          "

## 2018-11-19 NOTE — PROGRESS NOTES
Service Date: 11/19/2018      REFERRING PROVIDER:  Mehnaz Dawson MD      PRIMARY CARE PHYSICIAN:  Nury Bach MD      REASON FOR REFERRAL:  Second opinion for globus sensation.      CHIEF COMPLAINT:  Globus sensation.      HISTORY OF PRESENT ILLNESS:  Berto Pandey is a pleasant 53-year-old gentleman with a past medical history of GERD with possible eosinophilic esophagitis, peripheral artery disease, type 2 diabetes, dyslipidemia, chronic kidney disease and depression who is here today to discuss globus sensation.  The patient has been seen by GI in [place].  He says he has had years of a sensation of globus, and he points right to the level of his thyroid cartilage.  When asked directly, he denies food (solid or liquids) ever getting stuck when he swallows.  This sensation of globus happens whether he is eating or not.  Most often it happens when he is not eating.  He will feel that there is something all of the sudden in his throat, and he feels like he is choking and then the sensation passes.  He also has a separate his history of heartburn, but he does not pair the heartburn and the sensation of choking together.      The patient underwent an EGD by Dr. Singh 04/2018.  The biopsies showed some erosions in the distal esophagus, as well as a mild peptic stricture that was dilated to 18 mm.  I do not have access to the biopsy results. However, the patient was seen by Dr. Watters of the GI in followup, and he noted that in the distal esophagus there were 6 eosinophils and in the upper esophagus there were over 20 eosinophils.  The question was made whether he had eosinophilic esophagitis.  The patient notes that he was put on a PPI b.i.d., which perhaps did improve his symptoms somewhat; however, he was still having the symptoms, though less often.  The patient then was put on fluticasone to treat possible eosinophilic esophagitis; however, the patient did not take this regularly.      He has  followed with ENT for his symptoms of globus, and they found no clear etiology.  The patient is scheduled for a pH probe next week, and so he is, therefore, off of his PPI until then.      The patient also reports that he has been having less frequent and more hard stools.  He takes MiraLax for this, and when he takes this regularly, he does get good results.      REVIEW OF SYSTEMS:  A complete review of systems performed.  Pertinent positives and negatives are as stated above HPI.  The remainder of a complete review of systems is unremarkable.      PAST MEDICAL HISTORY:  This is obtained from the patient's Care Everywhere.     1.  The patient has a history of seizures.     2.  GERD with possible eosinophilic esophagitis, though the definition does not seem all that certain.   3.  Peripheral artery disease.   4.  Stable angina.   5.  Type 2 diabetes.   6.  Dyslipidemia.   7.  Lower back pain.   8.  Erectile dysfunction.   9.  Noncardiac chest pain.   10.  Stress urinary incontinence.   11.  Chronic kidney disease, stage 3.   12.  Coronary artery spasm.   13.  Urinary incontinence.   14.  Prostate cancer.   15.  Depression.   16.  Drug induced mental disorder.   17.  Angioedema in the past thought to be due to lisinopril and/or NSAIDs.   18.  History of H. pylori infection.   19.  History of anemia.   20.  Alcohol abuse in the past, though the patient denies ongoing alcohol use.    21.  Hypertension.      FAMILY HISTORY:  Mother with possible history of throat cancer.  No history of colon cancer, colon polyps, esophageal cancer or stomach cancer.  No history of pancreas or liver disease.  The patient is unaware of any additional family history.      SOCIAL HISTORY:  He continues to smoke half pack per day.  He denies any alcohol use.  He denies any marijuana use or other illicit drugs.  She is  and is accompanied by his wife today.      MEDICATIONS:  His medication list is extensive and reviewed in Epic.      1.  Albuterol.   2.  Abilify.   3.  Aspirin 81 mg.   4.  Colchicine.   5.  Cyclobenzaprine.   6.  Fluoxetine.   7.  Fluticasone.   8.  Isosorbide mononitrate.   9.  Latanoprost.   10.  Keppra.   11.  Loratadine.   12.  Metformin.   13.  Methocarbamol.   14.  Multivitamin.   15.  Nitroglycerin sublingual.   16.  Oxycodone/acetaminophen.   17.  Pantoprazole 40 mg p.o. b.i.d.  He states he is holding this for his test next week, the pH test.     18.  Phenobarbital.   19.  MiraLax.   20.  Detrol.   21.  Lipitor.   22.  Glipizide.   23.  Lisinopril.   24.  Metoprolol.   25.  Mirtazapine.   26.  Phenytoin.      PHYSICAL EXAMINATION:   VITAL SIGNS:  Blood pressure is 125/91, pulse 91, temperature is 97.5, weight is 170 pounds, BMI is 26, satting 96% on room air.   GENERAL:  He is pleasant, in no acute distress. His affect is somewhat flat.   HEENT:  Head is atraumatic, normocephalic.  Sclerae are anicteric without injection.  Oropharynx is clear with moist mucous membranes.   NECK:  Supple.  Back is nontender to palpation.   HEART:  Normal rate.   LUNGS:  He is breathing comfortably.   ABDOMEN:  Soft, nontender, nondistended.  No rebound or guarding.   EXTREMITIES:  Has no clubbing, cyanosis or edema.   NEUROLOGIC:  Awake, alert and oriented x3 with no focal deficits.   SKIN:  No evidence of rashes.      LABORATORY:  Reviewed in Care Everywhere.  CBC is notable for hemoglobin of 12-13.4, MCV is 85, platelets are 16, creatinine is normal for 1.2, albumin is 3.8.  AST, ALT, alkaline phosphatase and total bilirubin are all normal.  I cannot see his pathology results from his upper endoscopy.  There are multiple endoscopies reviewed in Care Everywhere.  Endoscopy in 2016 was unremarkable.  Later in 2016, there were some questions of eosinophilic esophagitis.  Biopsies are not available at this time.  He had an endoscopy earlier this year that showed peptic stricture, and the lower part of esophagus that was dilated to 18.   He has had gastric emptying study that showed delayed gastric emptying.  He has had a motility study that has been unremarkable.      ASSESSMENT AND PLAN:  Berto Pandey is a pleasant 53-year-old gentleman with a complicated past medical history as outlined above who is here today to discuss years of globus sensation:      1.  Globus.  The patient has been evaluated by GI at [facility].  He has had multiple endoscopies that have ranged anywhere from normal to a question of eosinophilic esophagitis on the most recent endoscopy earlier this year.  I do not have the biopsy results; however, in the followup GI note, they questioned increased eosinophils.  Given his history of heartburn symptoms and also documented delayed gastric emptying I think it is less likely that he has eosinophilic esophagitis and more likely that he has changes related to chronic reflux.  It is not clear to me that he has been compliant with his medications.  PPI therapy would be likely the best therapy for him going forward.  However, I think to document the presence of reflux, it is very reasonable to proceed with a pH study. This has already been scheduled through his GI providers in [facility].  I agree with getting this while he is off of his acid suppressing medications to ensure we can document that he has reflux.  The patient should follow up with his GI providers in [place] after the study to discuss the next steps.  If it confirms the presence of reflux, then he will likely require high dose b.i.d. PPI therapy going forward.  He should be reminded to take the PPI 30-60 minutes before his breakfast and dinner.  If he has ongoing symptoms of reflux despite then PPI therapy, then I would recommend addition of ranitidine at bedtime.  This reflux study will confirm the presence of reflux, but it will not tell us for sure whether his symptoms of globus are related to his reflux.  If his symptoms of globus sensation do not improve with  maximal therapy of his GERD, then he should continue to follow up with ENT to make sure that there are no other abnormalities that could be contributing to this.  I do note that the patient denies dysphasia and really it seems to be that he has a sensation of globus that is causing him issues.  I do also note that he had a motility study done a couple years ago that showed normal esophageal motility, so this does not need to be further evaluated.  We discussed antireflux precautions with him, and he should follow these lifestyle recommendations going forward.  Please also see the section below regarding gastroparesis, as this can contribute to reflux as well.     2.  GERD symptoms.  Please see above.     3.  History of gastroparesis.  Given his complicated past medical history including CKD and type 2 diabetes, it is not unexpected that he has delayed gastric emptying.  Delayed gastric emptying can exacerbate reflux, and this should be aggressively managed with dietary modifications.  I recommend that the patient follow up with his [place] GI providers to discuss further treatment, and this should include referral to a nutritionist to discuss a gastroparesis diet.  A typical gastroparesis diet includes small, frequent, low-fiber and low-fat meals.     4.  Constipation.  I recommend that the patient take his MiraLax as needed.  He can add soluble fiber such as Citrucel or Metamucil to help keep him more regular.  Soluble fiber typically does not exacerbate gastroparesis.      The patient will follow up with his GI providers and his ENT providers and his primary care provider up in [place].      Thank you very much for the opportunity to take part in the care of this patient.  We would be happy to see her back if there are any further questions.         VIRGINIE ASHTON MD             D: 11/19/2018   T: 11/19/2018   MT: DOMINGO      Name:     ELSY GARCIA   MRN:      [id number]        Account:      [id number]    :      1965           Service Date: 2018      Document: [id number]

## 2019-02-26 NOTE — MR AVS SNAPSHOT
After Visit Summary   11/19/2018    Berto Pandey    MRN: [id number]           Patient Information     Date Of Birth          1965        Visit Information        Provider Department      11/19/2018 10:00 AM Dony Bowser MD [facility]        Care Instructions    Good to see you today    I agree with getting the pH probe done next week OFF acid suppression    Then I recommend follow up with GI in [place] to discuss if anything further needs to be done.     Follow anti-reflux precautions    Follow up as needed      For questions regarding your care Monday through Friday, contact the RN GI care coordinator,  Call   [phone] . Your call will be  returned same day, or if consultation is needed with the provider, it may be following business day - or you may send a My Chart message.    For medication refills (prescribed by the GI clinic), contact your pharmacy.    For appointment rescheduling/cancellation, contact [phone]     After hours, or if you have an immediate GI concern and cannot wait for a return call, contact the GI Fellow at [phone] and select option #4.      GERD (Adult)    The esophagus is a tube that carries food from the mouth to the stomach. A valve (the LES, lower esophageal sphincter) at the lower end of the esophagus prevents stomach acid from flowing upward. When this valve doesn't work properly, stomach contents may repeatedly flow back up (reflux) into the esophagus. This is called gastroesophageal reflux disease (GERD). GERD can irritate the esophagus. It can cause problems with pain, swallowing or breathing. In severe cases, GERD can cause recurrent pneumonia (from aspiration or breathing in particles) or other serious problems.  Symptoms of reflux include burning, pressure or sharp pain in the upper abdomen or mid to lower chest. The pain can spread to the neck, back, or shoulder. There may be belching, an acid taste  "in the back of the throat, chronic cough, or sore throat, or hoarseness. GERD symptoms often occur during the day after a big meal. They can also occur at night when lying down.   Home care  Lifestyle changes can help reduce symptoms. If needed, your healthcare provider may prescribe medicines. Symptoms often improve with treatment, but if treatment is stopped, the symptoms often return after a few months. So most persons with GERD will need to continue treatment or get treatment on and off.  Lifestyle changes    Limit or avoid fatty, fried, and spicy foods, as well as coffee, chocolate, mint, and foods with high acid content such as tomatoes and citrus fruit and juices (orange, grapefruit, lemon).    Don t eat large meals, especially at night. Frequent, smaller meals are best. Don't lie down right after eating. And don t eat anything 3 hours before going to bed.    Don't drink alcohol or smoke. As much as possible, stay away from second hand smoke.    If you are overweight, losing weight will reduce symptoms.     Don't wear tight clothing around your stomach area.    If your symptoms occur during sleep, use a foam wedge to elevate your upper body (not just your head.) Or, place 4\" blocks under the head of your bed. Or use 2 bed risers under your bedframe.  Medicines  If needed, medicines can help relieve the symptoms of GERD and prevent damage to the esophagus. Discuss a medicine plan with your healthcare provider. This may include one or more of the following medicines:    Antacids to help neutralize the normal acids in your stomach.    Acid blockers (Histamine or H2 blockers) to decrease acid production.    Acid inhibitors (proton pump inhibitors PPIs) to decrease acid production in a different way than the blockers. They may work better, but can take a little longer to take effect.  Take an antacid 30 to 60 minutes after eating and at bedtime, but not at the same time as an acid blocker.  Try not to take " medicines such as ibuprofen and aspirin. If you are taking aspirin for your heart or other medical reasons, talk to your healthcare provider about stopping it.  Follow-up care  Follow up with your healthcare provider or as advised by our staff.  When to seek medical advice  Call your healthcare provider if any of the following occur:    Stomach pain gets worse or moves to the lower right abdomen (appendix area)    Chest pain appears or gets worse, or spreads to the back, neck, shoulder, or arm    An over-the-counter trial of medicine doesn't relieve your symptoms    Weight loss that can't be explained    Trouble or pain swallowing    Frequent vomiting (can t keep down liquids)    Blood in the stool or vomit (red or black in color)    Feeling weak or dizzy    Fever of 100.4 F (38 C) or higher, or as directed by your healthcare provider  Date Last Reviewed: 3/1/2018    6951-6727 [employer]. [address] [place] [address]. All rights reserved. This information is not intended as a substitute for professional medical care. Always follow your healthcare professional's instructions.                Follow-ups after your visit        Your next 10 appointments already scheduled     Dec 05, 2018 11:00 AM CST   (Arrive by 10:45 AM)   RETURN SLP VOICE with ANH Paredes   [facility])    [address] [place] [address]   [phone]            Dec 12, 2018  1:00 PM CST   (Arrive by 12:45 PM)   RETURN SLP VOICE with Luis Bowers, ANH   [facility] ([facility])    [address] [place] [address]   [phone]            Dec 27, 2018 11:00 AM CST   (Arrive by 10:45 AM)   RETURN SLP VOICE with Luis Bowers, ANH   [facility] ([facility])    [address] [place] [address]   [phone]            Jan 29, 2019  1:00 PM CST  "  (Arrive by 12:45 PM)   RETURN SLP VOICE with ANH Paredes   [facility] ([facility])    [address] [facility]  [address] [facility] [address] [phone]              Who to contact     Please call your clinic at [phone] to:    Ask questions about your health    Make or cancel appointments    Discuss your medicines    Learn about your test results    Speak to your doctor            Additional Information About Your Visit        MyChart Information     [employer] is an electronic gateway that provides easy, online access to your medical records. With [employer], you can request a clinic appointment, read your test results, renew a prescription or communicate with your care team.     To sign up for [employer] visit the website at [email or web address]   You will be asked to enter the access code listed below, as well as some personal information. Please follow the directions to create your username and password.     Your access code is: [email or web address]  Expires: 2018  2:59 PM     Your access code will  in 90 days. If you need help or a new code, please contact your [facility] or call [phone] for assistance.        Care EveryWhere ID     This is your Care EveryWhere ID. This could be used by other organizations to access your [place] medical records  [id number]        Your Vitals Were     Pulse Temperature Height Pulse Oximetry BMI (Body Mass Index)       91 97.5  F (36.4  C) (Oral) 1.702 m (5' 7\") 96% 26.7 kg/m2        Blood Pressure from Last 3 Encounters:   18 (!) 125/91   16 142/85   11/11/15 (!) 132/92    Weight from Last 3 Encounters:   18 77.3 kg (170 lb 8 oz)   10/15/18 79.4 kg (175 lb)   17 79.7 kg (175 lb 11.2 oz)              Today, you had the following     No orders found for display       Primary Care Provider Office Phone # Fax #    Nury Bach [phone]       " [facility] [address]  [facility] [address]        Equal Access to Services     VANDANAANAID ISH : Hadii estela silveira shaggy Sojosh, watommyda luqadaha, qaybta kaalmada lulu, josy scarlettin hayaatirso elisebashir millan rosas pavon. So [facility] [phone].    ATENCIÓN: Si habla español, tiene a cast disposición servicios gratuitos de asistencia lingüística. Llame al [phone].    We comply with applicable federal civil rights laws and [place] laws. We do not discriminate on the basis of race, color, national origin, age, disability, sex, sexual orientation, or gender identity.            Thank you!     Thank you for choosing [facility]  for your care. Our goal is always to provide you with excellent care. Hearing back from our patients is one way we can continue to improve our services. Please take a few minutes to complete the written survey that you may receive in the mail after your visit with us. Thank you!             Your Updated Medication List - Protect others around you: Learn how to safely use, store and throw away your medicines at www.disposemymeds.org.          This list is accurate as of 11/19/18 10:42 AM.  Always use your most recent med list.                   Brand Name Dispense Instructions for use Diagnosis    albuterol 108 (90 Base) MCG/ACT inhaler    PROAIR HFA/PROVENTIL HFA/VENTOLIN HFA     Inhale 2 puffs into the lungs        ARIPiprazole 10 MG tablet    ABILIFY     Take 10 mg by mouth daily        aspirin 81 MG tablet      Take 81 mg by mouth        atorvastatin 40 MG tablet    LIPITOR     Take 40 mg by mouth        clopidogrel 75 MG tablet    PLAVIX     Take 75 mg by mouth        colchicine 0.6 MG tablet    COLCYRS     Indications: Gout        cyclobenzaprine 10 MG tablet    FLEXERIL     TAKE 1 TABLET BY MOUTH THREE TIMES A DAY AS NEEDED FOR MUSCLE SPASMS FOR UP TO 10 DAYS.        * DILANTIN 100 MG CR capsule   Generic drug:  phenytoin      Take 300 mg by  they are doing a procedure because the baby  mouth daily        * phenytoin 100 MG CR capsule    DILANTIN     Take 100 mg by mouth        FLUoxetine 40 MG capsule    PROzac     Take 40 mg by mouth        fluticasone 220 MCG/ACT Inhaler    FLOVENT HFA     Take 2 puffs by mouth        fluticasone 50 MCG/ACT spray    FLONASE     PLACE 2 SPRAYS INTO BOTH NOSTRILS DAILY.        glipiZIDE 5 MG 24 hr tablet    GLUCOTROL XL     Take 5 mg by mouth        isosorbide mononitrate 30 MG 24 hr tablet    IMDUR     Take 30 mg by mouth        latanoprost 0.005 % ophthalmic solution    XALATAN     Apply 1 drop to eye        levETIRAcetam 500 MG 24 hr tablet    KEPPRA XR     TAKE 1 TABLET BY MOUTH EVERY DAY        lisinopril 2.5 MG tablet    PRINIVIL/Zestril     Take 2.5 mg by mouth        loratadine 10 MG tablet    CLARITIN     TAKE 1 TAB BY MOUTH DAILY.        metFORMIN 500 MG 24 hr tablet    GLUCOPHAGE-XR     Take 1,000 mg by mouth        methocarbamol 500 MG tablet    ROBAXIN     Take 500 mg by mouth        metoprolol succinate 50 MG 24 hr tablet    TOPROL-XL     Take 50 mg by mouth        mirtazapine 15 MG tablet    REMERON     Take 15 mg by mouth        MULTI-vitamin  /IRON Tabs           nitroGLYcerin 0.4 MG sublingual tablet    NITROSTAT     Place 0.4 mg under the tongue        oxyCODONE-acetaminophen 5-325 MG per tablet    PERCOCET          * pantoprazole 40 MG EC tablet    PROTONIX     Take 40 mg by mouth        * pantoprazole 40 MG EC tablet    PROTONIX     TAKE 1 TABLET BY MOUTH TWICE A DAY        PHENobarbital 30 MG tablet    LUMINAL     Take 30 mg by mouth daily        polyethylene glycol Packet    MIRALAX/GLYCOLAX     Take 17 g by mouth        tolterodine 4 MG 24 hr capsule    DETROL LA     Take 4 mg by mouth        * Notice:  This list has 4 medication(s) that are the same as other medications prescribed for you. Read the directions carefully, and ask your doctor or other care provider to review them with you.

## 2023-07-08 NOTE — TELEPHONE ENCOUNTER
Radiology report received from [facility]. Notified Tatianna to pull image.  CT chest abdomen pelvis on 1/5/16   [facility]

## 2024-05-22 NOTE — LETTER
10/15/2018       RE: Berto Pandey  [address]  [place] [address]     Dear Colleague,    Thank you for referring your patient, Berto Pandey, to the [facility] at [facility]. Please see a copy of my visit note below.    [facility] VOICE CLINIC  Cyrus Quintana Jr., M.D., F.A.C.S.  Mehnaz Dawson M.D., M.P.H.  Breann Chaney, Ph.D., CCC/SLP  Nae Kearney M.M. (voice), M.A., CCC/SLP  Luis Bowers M.M. (voice), M.A., CCC/SLP    [facility] VOICE [facility]  VOICE/SPEECH/BREATHING EVALUATION AND LARYNGEAL EXAMINATION REPORT    Patient: Berto Pandey  Date of Visit: 10/15/2018    CHIEF COMPLAINT:  Cough, voice    HISTORY  Berto Pandey was seen for initial voice evaluation and laryngeal examination in conjunction with a visit to Dr. Dawson.  Please refer to Dr. Dawson s dictation for a more complete history and impressions. Salient details of his history are as follows:    Mr. Pandey is a 52 year old with a history of cough and dysphonia.    Symptoms began years ago, without obvious precipitating factors    Has had considerable work-up for both problems, especially the cough, with inadequate resolution    Seen at [facility]; laryngeal examination showed a left ventricular lesion or swelling' referred here for further evaluation    No speech therapy for voice or cough    Long history reflux; takes PPI but continues to have symptoms    Feels that there is something in his throat on the left; he has to push on the left side of his throat to feel better, but this makes him cough    Voice is scratchy, and he would like it to be more normal, but it is less of a bother than the cough sensation    Interested in understanding the nature of his cough and dysphonia, and whether anything can be done    DIAGNOSIS/REASON FOR REFERRAL  Cough and Dysphonia/ Evaluate, perform laryngeal exam, treat as appropriate    Patient Supplied Answers To [facility] Intake General Questionnaire  [facility] Intake  - General Form Review 10/15/2018   Are you having any of these symptoms? Throat irritation, Mucus in throat, Frequent cough, Poor voice quality   Are you having any of these symptoms? Scratchy voice   Do you use caffeine? Yes   If you do use caffeine, how many oz. do you typically drink in a day? a lots   How many oz. of non-caffeinated fluid do you typically drink in a day? a very few   How often do you experience heartburn, indigestion, chest pain, stomach acid coming up, and/or tasting acid in your mouth or throat? Daily   Have reflux medications been recommended to you? Yes   If yes, are you taking them regularly? Yes   Voice: Yes   Cough and/or throat-clearing: Yes   Throat discomfort and/or the feeling of a lump in your throat: Yes   A different problem, not listed above: Yes   Other physicians/health care providers who should receive a copy of today's note (please provide first and last name(s), city): Dr mile rome\[facility]   If anyone is helping you complete this form (such as an , clinic staff, caregiver, family member, etc.) please identify them here. no       Patient Supplied Answers To [facility] Intake Voice Questionnaire  [facility] Intake - Voice Review 10/15/2018   How long have you had this voice problem? years   In regards to your voice problem, was there anything unusual about the time the problem was first noticed (such as illness, accident, surgery, etc.)?  If yes, please describe.  You have 200 characters to respond.  We will ask for more detail at your visit. years ago   What do you think caused this voice problem? don't know   How quickly did the voice problem develop? Suddenly   For your voice problem, how do the symptoms vary? Worse with stress, On and off   Over time, how has the voice problem changed? Same   How would you rate your typical vocal demand? Extensive: Voice needs go beyond everyday speech; voice demands are high but can be met even if voice is not perfect  "  Please list activities that involve your voice (such as singing, coaching, etc.): none   Effort for speaking 6   Effort for singing 2   Overall vocal quality 5   Is your voice ever normal, even briefly? Yes   For your voice problem, what testing/studies have you done (such as imaging/reflux testing)? none   Prior to this episode, have you ever had a voice problem before?  If yes, at that time did you have therapy or treatment for the voice problem?  Please provide a brief description of that treatment. no   For your voice problem, is there anything else you'd like to tell us? it's my thoat thats borthing me not the voice   There isn't much I can do to help myself feel better about this problem. Strongly Agree   How I deal with the voice problem now is under my control. Agree somewhat   I don't have much control over my emotional reactions to this problem. Strongly Agree   When I am upset about the voice problem, I can find a way to feel better. Disagree somewhat   I have control over my day-to-day reactions to the voice problem. Agree somewhat   There isn't much I can do to keep the voice problem from affecting me. Strongly Agree   I have control over how I think about the voice problem. Agree somewhat   My reaction to the voice problem is not under my control. Agree somewhat   VPCMEAN 1.75       Patient Supplied Answers To VHI Questionnaire  Voice Handicap Index (VHI-10) 10/15/2018   My voice makes it difficult for people to hear me 3   People have difficulty understanding me in a noisy room 3   My voice difficulties restrict my personal and social life.  2   I feel left out of conversations because of my voice 3   My voice problem causes me to lose income 0   I feel as though I have to strain to produce voice 0   The clarity of my voice is unpredictable 2   My voice problem upsets me 3   My voice makes me feel handicapped 2   People ask, \"What's wrong with your voice?\" 2   VHI-10 20       Patient Supplied " Answers To [employer] Intake Throat Discomfort Questionnaire  No flowsheet data found.      Patient Supplied Answers To [facility] Intake Cough/Throat-Clearing Questionnaire  [facility] Intake - Cough/Throat-Clearing Review 10/15/2018   How long have you had this cough/throat-clearing problem? years   What do you think caused this cough/throat-clearing problem? I don't know this what am trying to find out   How quickly did the cough/throat-clearing problem develop? Gradually   How do the cough/throat-clearing symptoms vary? Worse after heavy voice use, On and off   Is there a tickle in your throat prior to coughing or throat clearing? Yes   What % of the time do you feel like you can control the urge to cough? 50%   Do any of the following trigger your cough? Talking, Heartburn, Post-nasal drainage, Trying to suppress a cough   If you have other triggers for your cough or throat-clearing issue, please list them here. seem like am stranging   What prior treatments have been tried for this cough/throat-clearing issue? Other   What health care providers have you seen for this cough/throat-clearing problem? Who and when? ear nose throat clinic \in [facility]   For your cough/throat-clearing problem, what testing/studies have you done (such as imaging, reflux testing, lung function studies, allergy testing)? queit of few   Did you receive any therapy or treatment for this cough/throat-clearing problem?  If so, please briefly describe. yes   For your cough/throat-clearing problem, is there anything else you'd like to tell us? just want to find out is this a acidreflux problem       Patient Supplied Answers To CSI Questionnaire  No flowsheet data found.    CURRENT PATIENT COMPLAINTS    Primary: cough    Secondary: voice    Denies significant dyspnea, dysphagia, or pain    OTHER PERTINENT HISTORY    Still smoking about 1/2 pack per day    Complex medical Hx, including seizures that are well-controlled    Significant reflux Hx    Please also  see Dr. Dawson's dictation    OBJECTIVE FINDINGS  VOICE/ SPEECH/ NON-COMMUNICATIVE LARYNGEAL BEHAVIORS EVALUATION  An evaluation of the voice and cough was accomplished today; salient features are as follows:    Palpation of the laryngeal area shows:    Mild tenderness of the thyrohyoid area     Markedly reduced thyrohyoid space     Cough/ Throat clear:    FrequentI    Dry    Locus of cough/ throat clear: sounds consistent with upper airway    Moderate    Breathing pattern:    Appears within normal limits and adequate     No overt tension is evident.    Articulation differences, consistent with ebonics    He complains of difficulty pronouncing; it is difficult to discern articulation problems from speech style differences    Voice quality is characterized by    Very mild roughness that increases with tasks or voice use    Very mild inconsistent breathiness    Mild inconsistent secretion noise    Mild inconsistent strain    Habitual pitch is WNL, in the range from Bb2 to Db3    Loudness is WNL and appropriate for the setting    In a task comparing voiced and voiceless phonemes, there is no difference    Sustained vowels:     Comfortable pitch /a/: Ab2 - pressed, strained, rough (more effort than in spontaneous speech)    Comfortable pitch /i/: D3 -also pressed and mildly rough, though much higher in pitch    A singing task shows voice quality is slightly improved in singing, especially at the higher pitches, in the range from Bb2 to G3    In a pitch glide task to determine pitch range, he demonstrates very pressed, strain quality that is not actually indicative of his ability; however, not very stimulable for improvement    GLOBAL ASSESSMENT OF DYSPHONIA: 12 /100    CAPE-V Overall Severity:   24/100    AERODYNAMIC AND ACOUSTIC EVALUATION (CPT 52510 - Laryngeal Function Studies)  Laryngeal Function Studies (CPT 54453)     Acoustic Measures     Protocol / Parameter = result     Fundamental frequency Metrics        /a/  "mean F0 = 113 Hz (SD = 1.21 Hz)        /i/ mean F0 = 156 Hz (SD = 0.90 Hz)        [place] Mean f0 = 122 Hz (SD 8.26 Hz)       Combs:            Min F0 = 71 Hz           Max F0 = 234 Hz           Range = 164 Hz           Notes = pitch measures are accurate except for glide; measured attempt does not indicate the upper limit of pitch     Cepstral Measures        CPPS /a/ = 32.05 dB        CPPS /i/ = 32.28 dB        CPPS \"all voiced\" = 21.31 dB        AVQI (v.3.01) = 1.19     Additional Measures        Harmonic to Noise Ratio /a/ = 23.27 dB        Harmonic to Noise Ratio: [place] = 16.15 dB       Jitter (local) /a/ = 0.296 %        Shimmer (local) /a/ = 1.975 %     Aerodynamic Measures     Protocol / Parameter Result Normative Mean (SD)   Vital Capacity     Expiratory Volume 2.88 Liters 3.65 (1.09) Liters   Comfortable Sustained Phonation     Mean SPL 70.15 dB 74.59 (4.23) dB   Mean Pitch 108.21 Hz 121.14 (26.89) Hz   Peak Expiratory Airflow 0.488 Lit/Sec 0.15 (0.05) Lit/Sec   Mean Expiratory Airflow 0.303 Lit/Sec 0.11 (0.05) Lit/Sec   Voicing Efficiency     Peak Air Pressure 9.41 cm H2O 8.64 (2.23) cm H2O   Mean Peak Air Pressure 6.36 cm H2O 6.91 (1.68) cm H2O   Peak Expiratory Airflow 0.168 Lit/Sec 0.32 (0.15) Lit/Sec   Mean Airflow During Voicing 0.105 Lit/Sec 0.19 (0.1) Lit/Sec   Running Speech: All Voiced Stimulus     Mean SPL 57.72 dB    Mean Pitch 109.66 Hz    Peak Expiratory Airflow 0.449 Lit/Sec    Mean Expiratory Airflow 0.165 Lit/Sec    Mean Airflow During Voicing 0.143 Lit/Sec    Running Speech: [place]     Mean SPL 57.47 dB    SPL Range 41.15 dB    Mean Pitch 115.06 Hz    Pitch Range 203.09 Hz    Peak Expiratory Airflow 0.738 Lit/Sec    Mean Expiratory Airflow 0.115 Lit/Sec    Expiratory Volume 3.93 Liters    Mean Airflow During Voicing 0.113 Lit/Sec    Peak Inspiratory Airflow -1.221 Lit/Sec    Inspiratory Volume -5.15 Liters        Very high Cepstral measures not consistent " with perception of roughness/aperiodicity on sustained vowels.  Aerodynamic measures seem to indicate variable performance and lack of respiratory/phonatory coordination, as airflow varies from low to high, depending on task.  In running speech, frequent short shallow inhalations were observed.    LARYNGEAL EXAMINATION    Endoscopic laryngeal examination was performed by:  Mehnaz Dawson MD,   I provided technical support, and provided the protocol of instructions for the patient.  Type of exam:   Flexible endoscopy with chip-tip technology and stroboscopy    This exam shows:    Laryngeal and Vocal Fold Mucosa    Posterior commissure hypertrophy    moderate presence of dry thickened secretions on the vocal folds and throughout the laryngeal area    Fairly small swelling in the left ventricle, in the anterior portion    Vocal fold mucosa is   o Otherwise within normal limits, with no lesions and straight vibratory margins    Neurological and Functional Integrity of the Larynx    Vocal folds are mobile and meet at midline; movement is brisk and symmetric; exam is neurologically normal     Tension and incoordination are evident during a task of 20 quickly repeated vowels    Elongation of the vocal folds for pitch increase is WNL    On phonation, glottic closure is WNL    Supraglottic Function and Therapy Probes    moderate anterior-posterior constriction of the supraglottic larynx during connected speech    moderate ventricular approximation during phonation;     Stroboscopy provided the following additional information:    The ventricular swelling vibrates along with the vocal folds, and may provide some source of vibration noise    Vocal fold vibration can be essentially normal    Dr. Dawson and I reviewed this laryngeal exam with Mr. Pandey today, and I provided pertinent explanations, as well as written information:    Endoscopic findings are consistent with audio-perceptual assessment and patient  Hx/complaints.    his symptoms are accounted for by probable mucosal irritation and mild muscular hpyerfunction     Because there appears to be a functional component to his symptoms, he would most likely benefit from functional speech therapy.    ASSESSMENT / PLAN  IMPRESSIONS:  R05 (Chronic Cough)  R49.0 (Dysphonia)    Laryngeal evaluation demonstrated mild mucosal findings and moderate muscular hyperfunction  Dysphonia/discomfort is accounted for by the hyperfunction of the intrinsic and extrinsic laryngeal musculature    Cough/throat clear are accounted for by the hypersensitivity of the larynx and pharynx as evidenced by case history, patient complaints and absence of other organic findings; hypersensitivity is compounded by imbalance in the function of the intrinsic and extrinsic laryngeal musculature during connected speech  Because there appears to be a functional component to his symptoms, he would most likely benefit from a course of functional speech therapy  RECOMMENDATIONS:     A course of speech therapy is recommended to optimize vocal technique, improve voice quality, promote reduced discomfort, effort and fatigue and help reduce chronic cough.    He demonstrates a Good prognosis for improvement given adherence to therapeutic recommendations. Therapeutic     Positive indicators: commitment to process    Negative indicators: none    RATIONALE: Current level of functioning is:  CJ - At least 20 percent but less than 40 percent impaired, limited, or restricted   based on Mr. Pandey's extent of dysphonia, extent of impact on function, extent of discomfort, level of effort.    TREATMENT PLAN  Speech therapy    DURATION/FREQUENCY OF TREATMENT  Eight weekly or bi-weekly, one-hour sessions, with two monthly one-hour follow-up sessions, as well as continuing sessions along with Dr. Dawson to follow-up on meidcal evaluations and interventions     GOALS  Patient goal:    To understand the problem and fix it as  much as possible    Short-term goal(s): Within the first 2 sessions, Mr. Pandey:  1.  will demonstrate improved awareness of throat clearing / cough: acknowledging >75% of all cough events during session time with no clinician support  2.  will be able to demonstrate provided cough suppression and substitution strategies from memory independently with 90% accuracy  3.  will be able to independently list key factors in maintenance of good vocal hygiene with 80% accuracy, and report on their use outside the therapy room.  4.  will demonstrate semi-occluded vocal tract (SOVT) exercises with at least 80% accuracy with no clinician support  5.  will demonstrate the ability to alternate between target and habitual voice quality given clinician cue 75% of the time during therapy tasks  6.  will initiate Resonant Voice Therapy (RVT)    Long-term goal(s): In 8 months, Mr. Pandey will:  1.  Report a week of typical vocal activities, in which dysphonia and throat irritation do not exceed a level of 2 out of 10, 80% of the time   2.  Report a week with no more than 2 episodes of coughing per day, that do not last more than 2 seconds  3.  In a 20-minute conversation task, demonstrate strain and roughness that do not exceed a level of 2 out of 10, 80% of the time, by therapist judgment    G-Codes:   - Functional limitation, current status, at evalution CJ - At least 20 percent but less than 40 percent impaired, limited, or restricted   - Functional limitation, projected goal status, at discharge from therapy CI - At least 1 percent but less than 20 percent impaired, limited, or restricted    This treatment plan was developed with the patient who agreed with the recommendations.    PRIMARY ICD-10 code:  R05 (Chronic Cough)  SECONDARY ICD-10 code:  R49.0 (Dysphonia)     TOTAL SERVICE TIME: 90 minutes  EVALUATION OF VOICE AND RESONANCE: (40527): 60 minutes    LARYNGEAL FUNCTION STUDIES (30638): 30 minutes  NO CHARGE  FACILITY FEE (82262)      Breann Chaney, Ph.D., [facility]-SLP  Speech-Language Pathologist  Director, [facility]  [phone]              Again, thank you for allowing me to participate in the care of your patient.      Sincerely,    Breann Chaney, SLP       Quality 130: Documentation Of Current Medications In The Medical Record: Current Medications Documented Detail Level: Detailed